# Patient Record
Sex: FEMALE | Race: WHITE | NOT HISPANIC OR LATINO | Employment: OTHER | URBAN - METROPOLITAN AREA
[De-identification: names, ages, dates, MRNs, and addresses within clinical notes are randomized per-mention and may not be internally consistent; named-entity substitution may affect disease eponyms.]

---

## 2017-10-10 ENCOUNTER — CLINICAL SUPPORT (OUTPATIENT)
Dept: INTERNAL MEDICINE | Facility: CLINIC | Age: 52
End: 2017-10-10
Payer: COMMERCIAL

## 2017-10-10 ENCOUNTER — OFFICE VISIT (OUTPATIENT)
Dept: PULMONOLOGY | Facility: CLINIC | Age: 52
End: 2017-10-10
Payer: COMMERCIAL

## 2017-10-10 VITALS
SYSTOLIC BLOOD PRESSURE: 120 MMHG | DIASTOLIC BLOOD PRESSURE: 81 MMHG | HEIGHT: 64 IN | BODY MASS INDEX: 28 KG/M2 | HEART RATE: 73 BPM | WEIGHT: 164 LBS

## 2017-10-10 DIAGNOSIS — Z00.00 ANNUAL PHYSICAL EXAM: Primary | ICD-10-CM

## 2017-10-10 DIAGNOSIS — Z00.00 ROUTINE GENERAL MEDICAL EXAMINATION AT A HEALTH CARE FACILITY: Primary | ICD-10-CM

## 2017-10-10 LAB
ALBUMIN SERPL BCP-MCNC: 3.5 G/DL
ALP SERPL-CCNC: 66 U/L
ALT SERPL W/O P-5'-P-CCNC: 15 U/L
ANION GAP SERPL CALC-SCNC: 6 MMOL/L
AST SERPL-CCNC: 15 U/L
BILIRUB SERPL-MCNC: 0.5 MG/DL
BUN SERPL-MCNC: 13 MG/DL
CALCIUM SERPL-MCNC: 9.3 MG/DL
CHLORIDE SERPL-SCNC: 106 MMOL/L
CHOLEST SERPL-MCNC: 185 MG/DL
CHOLEST/HDLC SERPL: 2.8 {RATIO}
CO2 SERPL-SCNC: 26 MMOL/L
CREAT SERPL-MCNC: 0.8 MG/DL
ERYTHROCYTE [DISTWIDTH] IN BLOOD BY AUTOMATED COUNT: 14.4 %
EST. GFR  (AFRICAN AMERICAN): >60 ML/MIN/1.73 M^2
EST. GFR  (NON AFRICAN AMERICAN): >60 ML/MIN/1.73 M^2
ESTIMATED AVG GLUCOSE: 108 MG/DL
GLUCOSE SERPL-MCNC: 98 MG/DL
HBA1C MFR BLD HPLC: 5.4 %
HCT VFR BLD AUTO: 37.5 %
HCV AB SERPL QL IA: NEGATIVE
HDLC SERPL-MCNC: 66 MG/DL
HDLC SERPL: 35.7 %
HGB BLD-MCNC: 11.7 G/DL
LDLC SERPL CALC-MCNC: 102.4 MG/DL
MCH RBC QN AUTO: 26.1 PG
MCHC RBC AUTO-ENTMCNC: 31.2 G/DL
MCV RBC AUTO: 84 FL
NONHDLC SERPL-MCNC: 119 MG/DL
PLATELET # BLD AUTO: 313 K/UL
PMV BLD AUTO: 9.8 FL
POTASSIUM SERPL-SCNC: 4.3 MMOL/L
PROT SERPL-MCNC: 7.1 G/DL
RBC # BLD AUTO: 4.48 M/UL
SODIUM SERPL-SCNC: 138 MMOL/L
TRIGL SERPL-MCNC: 83 MG/DL
TSH SERPL DL<=0.005 MIU/L-ACNC: 2.33 UIU/ML
WBC # BLD AUTO: 4.84 K/UL

## 2017-10-10 PROCEDURE — 99396 PREV VISIT EST AGE 40-64: CPT | Mod: S$GLB,,, | Performed by: INTERNAL MEDICINE

## 2017-10-10 PROCEDURE — 97750 PHYSICAL PERFORMANCE TEST: CPT | Mod: S$GLB,,, | Performed by: INTERNAL MEDICINE

## 2017-10-10 PROCEDURE — 86803 HEPATITIS C AB TEST: CPT

## 2017-10-10 PROCEDURE — 80061 LIPID PANEL: CPT

## 2017-10-10 PROCEDURE — 80053 COMPREHEN METABOLIC PANEL: CPT

## 2017-10-10 PROCEDURE — 85027 COMPLETE CBC AUTOMATED: CPT

## 2017-10-10 PROCEDURE — 97802 MEDICAL NUTRITION INDIV IN: CPT | Mod: S$GLB,,, | Performed by: INTERNAL MEDICINE

## 2017-10-10 PROCEDURE — 84443 ASSAY THYROID STIM HORMONE: CPT

## 2017-10-10 PROCEDURE — 99999 PR PBB SHADOW E&M-EST. PATIENT-LVL III: CPT | Mod: PBBFAC,,, | Performed by: INTERNAL MEDICINE

## 2017-10-10 PROCEDURE — 36415 COLL VENOUS BLD VENIPUNCTURE: CPT

## 2017-10-10 PROCEDURE — 83036 HEMOGLOBIN GLYCOSYLATED A1C: CPT

## 2017-10-10 NOTE — PROGRESS NOTES
Subjective:       Patient ID: Timo Camargo is a 52 y.o. female.    Chief Complaint: Annual Exam    HPI  51 yo [employer] spouse,  works in the [employer] division. She feels well had a partial meniscuectomy on her left knee in June. Her left sciatic pain resolved with the medrol dospak. She feels well in general.She takes, zocor, wellbutrin and altace as her only medications.  Review of Systems   Constitutional: Negative.    HENT: Negative.    Eyes: Negative.    Respiratory: Negative.    Cardiovascular: Negative.    Gastrointestinal: Negative.    Genitourinary: Negative.    Musculoskeletal: Negative.         Left knee surgery in June.    Hx of limited sciatica pain last year. Resolved   Skin: Negative.    Neurological: Negative.    Hematological:        Hx of mild intermittent iron deficiency   Psychiatric/Behavioral: Negative.    All other systems reviewed and are negative.      Objective:      Physical Exam   Constitutional: She is oriented to person, place, and time. She appears well-developed and well-nourished. No distress.   HENT:   Head: Normocephalic and atraumatic.   Right Ear: External ear normal.   Left Ear: External ear normal.   Nose: Nose normal.   Mouth/Throat: Oropharynx is clear and moist.   Eyes: Conjunctivae and EOM are normal. Pupils are equal, round, and reactive to light.   Neck: Normal range of motion. Neck supple. No JVD present. No thyromegaly present.   Cardiovascular: Normal rate, regular rhythm, normal heart sounds and intact distal pulses.  Exam reveals no gallop.    No murmur heard.  Pulmonary/Chest: Breath sounds normal. No stridor. No respiratory distress. She has no wheezes. She has no rales. She exhibits no tenderness.   Peak flow 400 l/min   Abdominal: Soft. Bowel sounds are normal. She exhibits no distension and no mass. There is no tenderness. There is no rebound and no guarding.   Musculoskeletal: Normal range of motion. She exhibits no edema.   Lymphadenopathy:     She has no  cervical adenopathy.   Neurological: She is alert and oriented to person, place, and time. She has normal reflexes. She displays normal reflexes. No cranial nerve deficit.   Skin: Skin is warm and dry. No rash noted.   Psychiatric: She has a normal mood and affect. Her behavior is normal. Judgment and thought content normal.   Nursing note and vitals reviewed.      Assessment:       No diagnosis found.    Plan:            Labs: All parameters are normal. IMP Healthy Female with good health habits.

## 2017-10-10 NOTE — PROGRESS NOTES
"Nutrition Assessment  Client name:  Timo Camargo  :  1965  Age:  52 y.o.  Gender:  female    Client states:  Very pleasant spouse of a [employer] employee here for her annual Executive Health physical.  Denies significant changes in her PMH since last year although underwent meniscus repair.  Is a current Weight Watcher member although has not attended meetings for a month or so.  Travels weekly for work in [employer] and often, grabs food on the go if she does not have meals and snacks packed.  Is knowledgeable regarding healthy food choices although admits to poor food choices recently.  Has maintained an active lifestyle over the past several years, noting that her exercise regimen is "not the problem."  Desires to achieve goal weight of 146# although weight has steadily increased from 152# to 168#.  Inquired about new food trends, benefits of a plant-based diet, daily Calcium needs, and vitamin/mineral supplementation.     Past Medical History:   Diagnosis Date    Hypertension        Social History    Marital status:    Employment:  Unable to assess  Social History   Substance Use Topics    Smoking status: Former Smoker     Packs/day: 1.00     Years: 15.00     Types: Cigarettes     Quit date: 1/3/2005    Smokeless tobacco: Never Used    Alcohol use Yes      Comment: social drinker        Current medications:  has a current medication list which includes the following prescription(s): bupropion, calcium citrate-vitamin d3 315-200 mg, multivit-iron-min-folic acid, nitrofurantoin, ramipril, and simvastatin.  Vitamins, minerals, and/or supplements:  None   Food allergies or intolerances:  NKFA     Food History  Breakfast:  Eggs  Mid-morning Snack:  Yogurt  Lunch:  Salad + protein + fruit + water  Dinner:  Grilled chicken + vegetables + water    Exercise History:  Varied cardio and weight training (pilates, hot yoga, etc.) 3x/week    Lab Reports   Total Cholesterol:  185    Triglycerides:  " "83  HDL:  66  LDL:  102.4   Glucose:  98  HbA1c:  5.4%  BP:  120/81   Hgb:  11.7  Hct:  37.5%    Weight History  Height:  5' 3.25"     Weight:  168#  BMI:  29.5  % Body Fat:  36.34%    Diagnosis  RMR (Method:  Body Crowley):  1450 kcal  Activity Factor:  1.3  TORY:  1885 - 250 = 1635 kcal    Overweight related to previous improper food choices and lack of variation within exercise as evidenced by BMI:  29.5; 36.34% body fat.    Intervention    Goals:  1.  Achieve goal weight of 146#  2.  Reduce dining out frequency, packing meals and snacks  3.  Maintain current exercise regimen of 150 minutes/week or more as tolerated, varying mode of activity every 4-6 weeks  4.  Aim for 1,000 mg Calcium daily   5.  Consider MVI + fish oil supplementation  6.  Pair food sources of Iron with Vitamin C-rich foods, avoiding simultaneous consumption of Calcium-rich foods to enhance Iron absorption    To note, labs were not ready at time of consult with exception of CBC and so, not discussed with patient.  Low Hgb noted.  Discussed Iron-rich foods and ways to enhance absorption.  Answered patient's questions regarding vitamin/mineral supplementation, encouraging consideration of OTC MVI and fish oil for heart health.  Reviewed Gen[employer] Packsner's brand of vitamins/minerals and AHA's recommendations for fish oil content.  Also, discussed daily Calcium needs, UL for Calcium, potential health consequences of excessive Calcium intake, food sources of Calcium, and Calcium content of foods.  Reviewed [facility] Eat Fit Shopping List, highlighting new food products, in addition to reviewing [facility] Fast Food Guide.  Encouraged patient to maintain an active lifestyle while resuming meal prep so as to reduce restaurant foods, promote adequate energy, and sustainable weight loss.    Handouts provided:  Meal Planning Guide  Restaurant Guide  Eat Fit Shopping List  Eat Fit Gayla  Fast Food Guide  Vitamin/Mineral Guide  High Calcium Foods List  Calcium Content of " Foods    Monitoring/Evaluation    Monitor the following:  Weight  BMI  % Body Fat  Caloric intake  CBC    Follow Up Plan:  Follow up with client in 1-2 years

## 2017-10-10 NOTE — LETTER
October 10, 2017    Timo [facility]ANCO Files  [address] [place] [address]             [facility]  [address]  [facility] [address]  Phone: [phone] Dear Mrs. Camargo:    Thank you for allowing me to serve you and perform your Executive Health exam on 10/10/2017. This letter will serve as a brief summary of the physical findings and laboratory/studies performed and recommendations at this time. Today's assessment is normal in all respects. Next year schedule your colonoscope to time with your visit.         If you have any questions or concerns, please don't hesitate to call.    Sincerely,        Ryan Roque MD

## 2017-10-12 NOTE — PROGRESS NOTES
Subjective:       Patient ID: Timo Camargo is a 52 y.o. female.    Chief Complaint: No chief complaint on file.    HPI   Medical History:  Surgical hx of  ( & )  . Diagnosis of hypertension.  Medicinal hx of ramipril, simvastatin.     (L) Knee surgery  on  for partial menisectomy.  Slight ROM limitation and intermitten swellling.  She is able to be active.     Smokes occaisionally but doesn't consider herself a smoker.  She did indicate that she is going to stop cold turkey.     Activity:  Currently active. She spins 3 mins 3 times a week and does Pilates refomer classes along with Hot Yoga.     Completed fitness testing with no complications    Fitness evaluation and charting completed by: Teddy Bourgeois  Review of Systems    Objective:      The fitness evaluation results are as follows:    D.O.S. 10/10/2017   Height (in): 63.25   Weight (lbs): 168   BMI: 29.35113522   Body Fat (%): 36.34   Waist (cm): 78   Hip (cm): 109   WHR: 0.72   RBP (mmHg): 118/80   RHR (bpm): 60    Strength R (lbs)t: 88.76506054    Strength Lt (lbs): 78.01520747   Push-up Assessment: 25   Curl-up Assessment: 31   Flexibility Testing (cm): 48.5   REE (kcals): 1450     Physical Exam    Assessment:      Age/Gender Stratified Assessment:     Resting BP: Within Normal Limits   Body Fat %: Fair   WHR Risk Factor: Low Risk    Strength R: Above Average    Strength L: Above Average   Upper Body Endurance: Excellent   Abdominal Endurance: Above Average   Lower body Flexibiltiy: Excellent     1. Routine general medical examination at a health care facility        Plan:    Below guidelines should be completed.    Recommended Fitness Guidelines:   - 150 minutes of moderate intensity aerobic exercise each week OR 75 minutes of vigorous    - 2-4 days per week of resistance training for each muscle group   - Daily stretching

## 2019-04-09 DIAGNOSIS — Z00.00 ROUTINE GENERAL MEDICAL EXAMINATION AT A HEALTH CARE FACILITY: Primary | ICD-10-CM

## 2019-04-26 ENCOUNTER — TELEPHONE (OUTPATIENT)
Dept: ORTHOPEDICS | Facility: CLINIC | Age: 54
End: 2019-04-26

## 2019-04-26 DIAGNOSIS — S61.217A LACERATION OF LEFT LITTLE FINGER, FOREIGN BODY PRESENCE UNSPECIFIED, NAIL DAMAGE STATUS UNSPECIFIED, INITIAL ENCOUNTER: Primary | ICD-10-CM

## 2019-04-29 ENCOUNTER — HOSPITAL ENCOUNTER (OUTPATIENT)
Dept: RADIOLOGY | Facility: OTHER | Age: 54
Discharge: HOME | End: 2019-04-29
Attending: ORTHOPAEDIC SURGERY
Payer: COMMERCIAL

## 2019-04-29 ENCOUNTER — OFFICE VISIT (OUTPATIENT)
Dept: ORTHOPEDICS | Facility: CLINIC | Age: 54
End: 2019-04-29
Payer: COMMERCIAL

## 2019-04-29 VITALS — BODY MASS INDEX: 28 KG/M2 | HEIGHT: 64 IN | WEIGHT: 164 LBS

## 2019-04-29 DIAGNOSIS — S61.217A LACERATION OF LEFT LITTLE FINGER, FOREIGN BODY PRESENCE UNSPECIFIED, NAIL DAMAGE STATUS UNSPECIFIED, INITIAL ENCOUNTER: ICD-10-CM

## 2019-04-29 DIAGNOSIS — S64.40XA LACERATION OF DIGITAL NERVE OF FINGER, INITIAL ENCOUNTER: Primary | ICD-10-CM

## 2019-04-29 PROCEDURE — 3008F PR BODY MASS INDEX (BMI) DOCUMENTED: ICD-10-PCS | Mod: CPTII,S$GLB,, | Performed by: ORTHOPAEDIC SURGERY

## 2019-04-29 PROCEDURE — 73140 X-RAY EXAM OF FINGER(S): CPT | Mod: TC,FY

## 2019-04-29 PROCEDURE — 99204 PR OFFICE/OUTPT VISIT, NEW, LEVL IV, 45-59 MIN: ICD-10-PCS | Mod: S$GLB,,, | Performed by: ORTHOPAEDIC SURGERY

## 2019-04-29 PROCEDURE — 3008F BODY MASS INDEX DOCD: CPT | Mod: CPTII,S$GLB,, | Performed by: ORTHOPAEDIC SURGERY

## 2019-04-29 PROCEDURE — 73140 XR FINGER 2 OR MORE VIEWS: ICD-10-PCS | Mod: 26,LT,, | Performed by: RADIOLOGY

## 2019-04-29 PROCEDURE — 99204 OFFICE O/P NEW MOD 45 MIN: CPT | Mod: S$GLB,,, | Performed by: ORTHOPAEDIC SURGERY

## 2019-04-29 PROCEDURE — 99999 PR PBB SHADOW E&M-EST. PATIENT-LVL III: ICD-10-PCS | Mod: PBBFAC,,, | Performed by: ORTHOPAEDIC SURGERY

## 2019-04-29 PROCEDURE — 99999 PR PBB SHADOW E&M-EST. PATIENT-LVL III: CPT | Mod: PBBFAC,,, | Performed by: ORTHOPAEDIC SURGERY

## 2019-04-29 PROCEDURE — 73140 X-RAY EXAM OF FINGER(S): CPT | Mod: 26,LT,, | Performed by: RADIOLOGY

## 2019-04-29 RX ORDER — MUPIROCIN 20 MG/G
OINTMENT TOPICAL
Status: CANCELLED | OUTPATIENT
Start: 2019-04-29

## 2019-04-29 RX ORDER — SODIUM CHLORIDE 9 MG/ML
INJECTION, SOLUTION INTRAVENOUS CONTINUOUS
Status: CANCELLED | OUTPATIENT
Start: 2019-04-29

## 2019-04-29 NOTE — PROGRESS NOTES
[facility]  History & Physical  Orthopedics    SUBJECTIVE:      Chief Complaint: left small finger laceration    Referring Provider: No ref. provider found     History of Present Illness:  Patient is a 53 y.o. right hand dominant female who presents today with complaints of left small finger laceration. She reports cutting her finger on a clean knife last thursday. She was seen in [facility] and it was irrigated and closed. She is unable to feel distal to the laceration.     The patient is a physical therapist.    Onset of symptoms/DOI was 4 days ago.    Symptoms are aggravated by activity.    Symptoms are alleviated by rest.    Symptoms consist of laceration, numbness.    The patient rates their pain as a 0/10.    Attempted treatment(s) and/or interventions include irrigation and sutures     The patient denies any fevers, chills, N/V, D/C and presents for evaluation.       Past Medical History:   Diagnosis Date    Hypertension      Past Surgical History:   Procedure Laterality Date     SECTION       Review of patient's allergies indicates:   Allergen Reactions    Penicillins Rash    Sulfa (sulfonamide antibiotics) Rash     Social History     Social History Narrative    Not on file     Family History   Problem Relation Age of Onset    Hypertension Mother     Coronary artery disease Father     Hypertension Father          Current Outpatient Medications:     buPROPion (WELLBUTRIN XL) 300 MG 24 hr tablet, TAKE 1 TABLET DAILY, Disp: 90 tablet, Rfl: 3    MULTIVIT-IRON-MIN-FOLIC ACID 3,500-18-0.4 UNIT-MG-MG ORAL CHEW, Take by mouth once daily., Disp: , Rfl:     nitrofurantoin (MACRODANTIN) 100 MG capsule, Take 1 capsule by mouth once daily., Disp: , Rfl: 11    ramipril (ALTACE) 5 MG capsule, TAKE 1 CAPSULE DAILY, Disp: 90 capsule, Rfl: 3    simvastatin (ZOCOR) 10 MG tablet, TAKE 1 TABLET DAILY, Disp: 90 tablet, Rfl: 3    calcium citrate-vitamin D3 315-200 mg (CITRACAL+D)  "315-200 mg-unit per tablet, Take 1 tablet by mouth 2 (two) times daily., Disp: , Rfl:       Review of Systems:  Constitutional: no fever or chills  Eyes: no visual changes  ENT: no nasal congestion or sore throat  Respiratory: no cough or shortness of breath  Cardiovascular: no chest pain  Gastrointestinal: no nausea or vomiting, tolerating diet  Musculoskeletal: numbness/tingling    OBJECTIVE:      Vital Signs (Most Recent):  Vitals:    04/29/19 1459   Weight: 74.4 kg (164 lb 0.4 oz)   Height: 5' 4" (1.626 m)     Body mass index is 28.15 kg/m².      Physical Exam:  Constitutional: The patient appears well-developed and well-nourished. No distress.   Head: Normocephalic and atraumatic.   Nose: Nose normal.   Eyes: Conjunctivae and EOM are normal.   Neck: No tracheal deviation present.   Cardiovascular: Normal rate and intact distal pulses.    Pulmonary/Chest: Effort normal. No respiratory distress.   Abdominal: There is no guarding.   Neurological: The patient is alert.   Psychiatric: The patient has a normal mood and affect.     Left Hand/Wrist Examination:    Observation/Inspection:  Swelling  none    Deformity  none  Discoloration  none     Scars   none    Atrophy  none    HAND/WRIST EXAMINATION:  Finkelstein's Test   Neg  WHAT Test    Neg  Snuff box tenderness   Neg  Nicholas's Test    Neg  Hook of Hamate Tenderness  Neg  CMC grind    Neg  Circumduction test   Neg    Neurovascular Exam:  Digits WWP, brisk CR < 3s throughout  NVI motor/LTS to M/R/U nerves, radial pulse 2+  Tinel's Test - Carpal Tunnel  Neg  Tinel's Test - Cubital Tunnel  Neg  Phalen's Test    Neg  Median Nerve Compression Test Neg    ROM hand/wrist/elbow full, painless    There is a 1.5cm oblique laceration of the volar aspect of the small finger at level of DIP  She has diminished sensation distal to the laceration radially  FDP and FDS tested in isolation to small finger and intact      RRR, lungs CTAB  Abdomen soft nontender    Diagnostic " Results:     Xray - negative  EMG - n/a    ASSESSMENT/PLAN:      53 y.o. yo female with left small finger digital nerve laceration    Plan: discussed treatment options with patient including primary repair.  She would like to proceed on Thursday 5/2/19 for nerve exploration and possible repair. Risks and benefits discussed including infection, stiffness, neuroma, permanent loss of function and sensation.         Bob Greene M.D.       Never smoker

## 2019-04-29 NOTE — H&P
I have seen the patient, reviewed the Resident's history and physical, assessment and plan. I have personally interviewed and examined the patient at bedside and agree with the findings.      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 She describes dense numbness and tingling in the radial aspect of the small finger distal to the laceration.  She reports ulnar-sided sensation is within normal limits and she presents for evaluation today. On examination her FDP and FDS tendons to the small finger are intact and strong to strength exam.  She has dense numbness distal to the radial sided laceration of the left small finger with an associated Tinel's at the laceration site.  X-rays do not demonstrate any acute fractures or dislocations.     The patient is a 53-year-old female status post sharp laceration to her left small finger with findings concerning for a radial digital nerve laceration.  The risks, benefits and alternatives to surgery were discussed with the patient in detail.  Specific risks include but are not limited to failure of any attempted repairs, need for allograft nerve tissue to provide for a tension-free repair, incomplete recovery of sensation, neuroma, bleeding, infection, vessel and/or nerve damage, pain, numbness, tingling, complex regional pain syndrome, compartment syndrome, failure to return to pre-injury and/or preoperative functional status, scar sensitivity, delayed healing, inability to return to work, pulley injury, tendon injury, bowstringing, partial and/or incomplete relief of symptoms, weakness, persistence of and/or worsening of symptoms, surgical failure, osteomyelitis, amputation, loss of function, stiffness, functional debility, dysfunction, decreased  strength, need for prolonged postoperative  rehabilitation, need for further surgery, deep venous thrombosis, pulmonary embolism, arthritis and death.  The patient states an understanding and wishes to proceed with surgery.   All questions were answered.  No guarantees were implied or stated.  Written informed consent was obtained.           Bob Greene MD  [facility] [address]      Expand All Collapse All            []Hide copied text    []Hover for details      [facility]  History & Physical  Orthopedics     SUBJECTIVE:       Chief Complaint: left small finger laceration     Referring Provider: No ref. provider found      History of Present Illness:  Patient is a 53 y.o. right hand dominant female who presents today with complaints of left small finger laceration. She reports cutting her finger on a clean knife last thursday. She was seen in [facility] and it was irrigated and closed. She is unable to feel distal to the laceration.      The patient is a physical therapist.     Onset of symptoms/DOI was 4 days ago.     Symptoms are aggravated by activity.     Symptoms are alleviated by rest.     Symptoms consist of laceration, numbness.     The patient rates their pain as a 0/10.     Attempted treatment(s) and/or interventions include irrigation and sutures     The patient denies any fevers, chills, N/V, D/C and presents for evaluation.             Past Medical History:   Diagnosis Date    Hypertension              Past Surgical History:   Procedure Laterality Date     SECTION              Review of patient's allergies indicates:   Allergen Reactions    Penicillins Rash    Sulfa (sulfonamide antibiotics) Rash      Social History          Social History Narrative    Not on file            Family History   Problem Relation Age of Onset    Hypertension Mother      Coronary artery disease Father      Hypertension Father              Current Outpatient Medications:     buPROPion (WELLBUTRIN XL) 300 MG 24  "hr tablet, TAKE 1 TABLET DAILY, Disp: 90 tablet, Rfl: 3    MULTIVIT-IRON-MIN-FOLIC ACID 3,500-18-0.4 UNIT-MG-MG ORAL CHEW, Take by mouth once daily., Disp: , Rfl:     nitrofurantoin (MACRODANTIN) 100 MG capsule, Take 1 capsule by mouth once daily., Disp: , Rfl: 11    ramipril (ALTACE) 5 MG capsule, TAKE 1 CAPSULE DAILY, Disp: 90 capsule, Rfl: 3    simvastatin (ZOCOR) 10 MG tablet, TAKE 1 TABLET DAILY, Disp: 90 tablet, Rfl: 3    calcium citrate-vitamin D3 315-200 mg (CITRACAL+D) 315-200 mg-unit per tablet, Take 1 tablet by mouth 2 (two) times daily., Disp: , Rfl:         Review of Systems:  Constitutional: no fever or chills  Eyes: no visual changes  ENT: no nasal congestion or sore throat  Respiratory: no cough or shortness of breath  Cardiovascular: no chest pain  Gastrointestinal: no nausea or vomiting, tolerating diet  Musculoskeletal: numbness/tingling     OBJECTIVE:       Vital Signs (Most Recent):  Vitals       Vitals:     04/29/19 1459   Weight: 74.4 kg (164 lb 0.4 oz)   Height: 5' 4" (1.626 m)         Body mass index is 28.15 kg/m².        Physical Exam:  Constitutional: The patient appears well-developed and well-nourished. No distress.   Head: Normocephalic and atraumatic.   Nose: Nose normal.   Eyes: Conjunctivae and EOM are normal.   Neck: No tracheal deviation present.   Cardiovascular: Normal rate and intact distal pulses.    Pulmonary/Chest: Effort normal. No respiratory distress.   Abdominal: There is no guarding.   Neurological: The patient is alert.   Psychiatric: The patient has a normal mood and affect.      Left Hand/Wrist Examination:     Observation/Inspection:  Swelling                       none                  Deformity                     none  Discoloration               none                  Scars                           none                  Atrophy                        none     HAND/WRIST EXAMINATION:  Finkelstein's Test                                Neg  WHAT Test           "                               Neg  Snuff box tenderness                          Neg  Nicholas's Test                                     Neg  Hook of Hamate Tenderness              Neg  CMC grind                                           Neg  Circumduction test                              Neg     Neurovascular Exam:  Digits WWP, brisk CR < 3s throughout  NVI motor/LTS to M/R/U nerves, radial pulse 2+  Tinel's Test - Carpal Tunnel                Neg  Tinel's Test - Cubital Tunnel               Neg  Phalen's Test                                      Neg  Median Nerve Compression Test       Neg     ROM hand/wrist/elbow full, painless     There is a 1.5cm oblique laceration of the volar aspect of the small finger at level of DIP  She has diminished sensation distal to the laceration radially  FDP and FDS tested in isolation to small finger and intact        RRR, lungs CTAB  Abdomen soft nontender     Diagnostic Results:     Xray - negative  EMG - n/a     ASSESSMENT/PLAN:       53 y.o. yo female with left small finger digital nerve laceration     Plan: discussed treatment options with patient including primary repair.  She would like to proceed on Thursday 5/2/19 for nerve exploration and possible repair. Risks and benefits discussed including infection, stiffness, neuroma, permanent loss of function and sensation.           Bob Greene M.D.

## 2019-05-01 ENCOUNTER — TELEPHONE (OUTPATIENT)
Dept: ORTHOPEDICS | Facility: CLINIC | Age: 54
End: 2019-05-01

## 2019-05-01 NOTE — TELEPHONE ENCOUNTER
----- Message from Elsie Salter sent at 5/1/2019  8:08 AM CDT -----  Contact: MARY SCHWARTZ   Name of Who is Calling: MARY SCHWARTZ       What is the request in detail: Patient states she would like to  cancel her surgery that is scheduled for tomorrow 05/02/2019      Can the clinic reply by [facility]: no      What Number to Call Back if not in [facility]: [phone]

## 2019-05-01 NOTE — TELEPHONE ENCOUNTER
Called patient in regards to phone message about cancelling her surgery on 5/2/19. Left a detailed message along with a call back number so we can discuss.

## 2019-06-19 PROBLEM — F32.9 REACTIVE DEPRESSION: Status: ACTIVE | Noted: 2019-06-19

## 2019-06-19 PROBLEM — I10 ESSENTIAL (PRIMARY) HYPERTENSION: Status: ACTIVE | Noted: 2019-06-19

## 2019-06-19 PROBLEM — E78.00 HYPERCHOLESTEREMIA: Status: ACTIVE | Noted: 2019-06-19

## 2019-08-14 ENCOUNTER — OFFICE VISIT (OUTPATIENT)
Dept: PULMONOLOGY | Facility: CLINIC | Age: 54
End: 2019-08-14
Payer: COMMERCIAL

## 2019-08-14 ENCOUNTER — CLINICAL SUPPORT (OUTPATIENT)
Dept: INTERNAL MEDICINE | Facility: CLINIC | Age: 54
End: 2019-08-14
Payer: COMMERCIAL

## 2019-08-14 ENCOUNTER — HOSPITAL ENCOUNTER (OUTPATIENT)
Dept: RADIOLOGY | Facility: HOSPITAL | Age: 54
Discharge: HOME | End: 2019-08-14
Attending: INTERNAL MEDICINE
Payer: COMMERCIAL

## 2019-08-14 ENCOUNTER — HOSPITAL ENCOUNTER (OUTPATIENT)
Dept: CARDIOLOGY | Facility: CLINIC | Age: 54
Discharge: HOME | End: 2019-08-14
Attending: INTERNAL MEDICINE
Payer: COMMERCIAL

## 2019-08-14 VITALS
WEIGHT: 162 LBS | HEIGHT: 64 IN | DIASTOLIC BLOOD PRESSURE: 80 MMHG | HEART RATE: 76 BPM | BODY MASS INDEX: 27.66 KG/M2 | SYSTOLIC BLOOD PRESSURE: 133 MMHG

## 2019-08-14 DIAGNOSIS — Z00.00 ROUTINE GENERAL MEDICAL EXAMINATION AT A HEALTH CARE FACILITY: ICD-10-CM

## 2019-08-14 DIAGNOSIS — Z00.00 ROUTINE GENERAL MEDICAL EXAMINATION AT A HEALTH CARE FACILITY: Primary | ICD-10-CM

## 2019-08-14 DIAGNOSIS — Z00.00 ANNUAL PHYSICAL EXAM: Primary | ICD-10-CM

## 2019-08-14 LAB
ALBUMIN SERPL BCP-MCNC: 3.9 G/DL (ref 3.5–5.2)
ALP SERPL-CCNC: 61 U/L (ref 55–135)
ALT SERPL W/O P-5'-P-CCNC: 17 U/L (ref 10–44)
ANION GAP SERPL CALC-SCNC: 8 MMOL/L (ref 8–16)
AST SERPL-CCNC: 16 U/L (ref 10–40)
BILIRUB SERPL-MCNC: 0.4 MG/DL (ref 0.1–1)
BUN SERPL-MCNC: 13 MG/DL (ref 6–20)
CALCIUM SERPL-MCNC: 10.2 MG/DL (ref 8.7–10.5)
CHLORIDE SERPL-SCNC: 106 MMOL/L (ref 95–110)
CHOLEST SERPL-MCNC: 181 MG/DL (ref 120–199)
CHOLEST/HDLC SERPL: 2.5 {RATIO} (ref 2–5)
CO2 SERPL-SCNC: 29 MMOL/L (ref 23–29)
CREAT SERPL-MCNC: 0.9 MG/DL (ref 0.5–1.4)
CV STRESS BASE HR: 65 BPM
DIASTOLIC BLOOD PRESSURE: 80 MMHG
ERYTHROCYTE [DISTWIDTH] IN BLOOD BY AUTOMATED COUNT: 12.5 % (ref 11.5–14.5)
EST. GFR  (AFRICAN AMERICAN): >60 ML/MIN/1.73 M^2
EST. GFR  (NON AFRICAN AMERICAN): >60 ML/MIN/1.73 M^2
ESTIMATED AVG GLUCOSE: 100 MG/DL (ref 68–131)
GLUCOSE SERPL-MCNC: 95 MG/DL (ref 70–110)
HBA1C MFR BLD HPLC: 5.1 % (ref 4–5.6)
HCT VFR BLD AUTO: 41.2 % (ref 37–48.5)
HDLC SERPL-MCNC: 71 MG/DL (ref 40–75)
HDLC SERPL: 39.2 % (ref 20–50)
HGB BLD-MCNC: 12.8 G/DL (ref 12–16)
LDLC SERPL CALC-MCNC: 87.2 MG/DL (ref 63–159)
MCH RBC QN AUTO: 29.6 PG (ref 27–31)
MCHC RBC AUTO-ENTMCNC: 31.1 G/DL (ref 32–36)
MCV RBC AUTO: 95 FL (ref 82–98)
NONHDLC SERPL-MCNC: 110 MG/DL
OHS CV CPX 1 MINUTE RECOVERY HEART RATE: 123 BPM
OHS CV CPX 85 PERCENT MAX PREDICTED HEART RATE MALE: 136
OHS CV CPX ESTIMATED METS: 15
OHS CV CPX MAX PREDICTED HEART RATE: 160
OHS CV CPX PATIENT IS FEMALE: 1
OHS CV CPX PATIENT IS MALE: 0
OHS CV CPX PEAK DIASTOLIC BLOOD PRESSURE: 91 MMHG
OHS CV CPX PEAK HEAR RATE: 141 BPM
OHS CV CPX PEAK RATE PRESSURE PRODUCT: NORMAL
OHS CV CPX PEAK SYSTOLIC BLOOD PRESSURE: 161 MMHG
OHS CV CPX PERCENT MAX PREDICTED HEART RATE ACHIEVED: 88
OHS CV CPX RATE PRESSURE PRODUCT PRESENTING: 7735
PLATELET # BLD AUTO: 281 K/UL (ref 150–350)
PMV BLD AUTO: 10.3 FL (ref 9.2–12.9)
POTASSIUM SERPL-SCNC: 4.8 MMOL/L (ref 3.5–5.1)
PROT SERPL-MCNC: 7.2 G/DL (ref 6–8.4)
RBC # BLD AUTO: 4.32 M/UL (ref 4–5.4)
SODIUM SERPL-SCNC: 143 MMOL/L (ref 136–145)
STRESS ECHO POST EXERCISE DUR MIN: 8 MINUTES
STRESS ECHO POST EXERCISE DUR SEC: 31 SECONDS
STRESS ECHO TARGET HR: 142.8 BPM
SYSTOLIC BLOOD PRESSURE: 119 MMHG
TRIGL SERPL-MCNC: 114 MG/DL (ref 30–150)
TSH SERPL DL<=0.005 MIU/L-ACNC: 2.23 UIU/ML (ref 0.4–4)
WBC # BLD AUTO: 4.2 K/UL (ref 3.9–12.7)

## 2019-08-14 PROCEDURE — 93016 CV STRESS TEST SUPVJ ONLY: CPT | Mod: ,,, | Performed by: INTERNAL MEDICINE

## 2019-08-14 PROCEDURE — 71046 XR CHEST PA AND LATERAL: ICD-10-PCS | Mod: 26,,, | Performed by: RADIOLOGY

## 2019-08-14 PROCEDURE — 80053 COMPREHEN METABOLIC PANEL: CPT

## 2019-08-14 PROCEDURE — 3075F SYST BP GE 130 - 139MM HG: CPT | Mod: CPTII,S$GLB,, | Performed by: INTERNAL MEDICINE

## 2019-08-14 PROCEDURE — 83036 HEMOGLOBIN GLYCOSYLATED A1C: CPT

## 2019-08-14 PROCEDURE — 99386 PREV VISIT NEW AGE 40-64: CPT | Mod: S$PBB,,, | Performed by: INTERNAL MEDICINE

## 2019-08-14 PROCEDURE — 97802 MEDICAL NUTRITION INDIV IN: CPT | Mod: S$GLB,,, | Performed by: INTERNAL MEDICINE

## 2019-08-14 PROCEDURE — 99386 PR PREVENTIVE VISIT,NEW,40-64: ICD-10-PCS | Mod: S$PBB,,, | Performed by: INTERNAL MEDICINE

## 2019-08-14 PROCEDURE — 97750 PHYSICAL PERFORMANCE TEST: CPT | Mod: S$GLB,,, | Performed by: INTERNAL MEDICINE

## 2019-08-14 PROCEDURE — 3079F PR MOST RECENT DIASTOLIC BLOOD PRESSURE 80-89 MM HG: ICD-10-PCS | Mod: CPTII,S$GLB,, | Performed by: INTERNAL MEDICINE

## 2019-08-14 PROCEDURE — 97802 PR MED NUTR THER, 1ST, INDIV, EA 15 MIN: ICD-10-PCS | Mod: S$GLB,,, | Performed by: INTERNAL MEDICINE

## 2019-08-14 PROCEDURE — 99999 PR PBB SHADOW E&M-EST. PATIENT-LVL II: ICD-10-PCS | Mod: PBBFAC,,, | Performed by: INTERNAL MEDICINE

## 2019-08-14 PROCEDURE — 3079F DIAST BP 80-89 MM HG: CPT | Mod: CPTII,S$GLB,, | Performed by: INTERNAL MEDICINE

## 2019-08-14 PROCEDURE — 93018 CV STRESS TEST I&R ONLY: CPT | Mod: ,,, | Performed by: INTERNAL MEDICINE

## 2019-08-14 PROCEDURE — 3075F PR MOST RECENT SYSTOLIC BLOOD PRESS GE 130-139MM HG: ICD-10-PCS | Mod: CPTII,S$GLB,, | Performed by: INTERNAL MEDICINE

## 2019-08-14 PROCEDURE — 93018 TREADMILL STRESS TEST (CUPID ONLY): ICD-10-PCS | Mod: ,,, | Performed by: INTERNAL MEDICINE

## 2019-08-14 PROCEDURE — 71046 X-RAY EXAM CHEST 2 VIEWS: CPT | Mod: TC,FY

## 2019-08-14 PROCEDURE — 93016 TREADMILL STRESS TEST (CUPID ONLY): ICD-10-PCS | Mod: ,,, | Performed by: INTERNAL MEDICINE

## 2019-08-14 PROCEDURE — 84443 ASSAY THYROID STIM HORMONE: CPT

## 2019-08-14 PROCEDURE — 80061 LIPID PANEL: CPT

## 2019-08-14 PROCEDURE — 71046 X-RAY EXAM CHEST 2 VIEWS: CPT | Mod: 26,,, | Performed by: RADIOLOGY

## 2019-08-14 PROCEDURE — 85027 COMPLETE CBC AUTOMATED: CPT

## 2019-08-14 PROCEDURE — 93017 CV STRESS TEST TRACING ONLY: CPT

## 2019-08-14 PROCEDURE — 36415 COLL VENOUS BLD VENIPUNCTURE: CPT

## 2019-08-14 PROCEDURE — 99999 PR PBB SHADOW E&M-EST. PATIENT-LVL II: CPT | Mod: PBBFAC,,, | Performed by: INTERNAL MEDICINE

## 2019-08-14 PROCEDURE — 97750 PR PHYSICAL PERFORMANCE TEST: ICD-10-PCS | Mod: S$GLB,,, | Performed by: INTERNAL MEDICINE

## 2019-08-14 NOTE — PROGRESS NOTES
Subjective:       Patient ID: Timo Camargo is a 54 y.o. female.    Chief Complaint: Annual Exam    HPI 55 yo [employer] spouse (  works in the [employer] division) comes for her periodic health exam. She feels well, exercises 5 days a week in the early AM before work. Her knee surgery was successful and she is not taking any medications at the present time.  Review of Systems   Constitutional: Negative.    HENT: Negative.    Eyes: Negative.    Respiratory: Negative.    Cardiovascular: Negative.    Gastrointestinal: Negative.    Genitourinary: Negative.    Musculoskeletal: Negative.         Successful knee surgery   Skin: Negative.    Neurological: Negative.    Psychiatric/Behavioral: Negative.    All other systems reviewed and are negative.      Objective:      Physical Exam   Constitutional: She is oriented to person, place, and time. She appears well-developed and well-nourished. No distress.   HENT:   Head: Normocephalic and atraumatic.   Right Ear: External ear normal.   Left Ear: External ear normal.   Nose: Nose normal.   Mouth/Throat: Oropharynx is clear and moist.   Eyes: Pupils are equal, round, and reactive to light. Conjunctivae and EOM are normal.   Neck: Normal range of motion. Neck supple. No JVD present. No thyromegaly present.   Cardiovascular: Normal rate, regular rhythm, normal heart sounds and intact distal pulses. Exam reveals no gallop.   No murmur heard.  BP: 118/76   Pulmonary/Chest: Breath sounds normal. No stridor. No respiratory distress. She has no wheezes. She has no rales. She exhibits no tenderness.   Abdominal: Soft. Bowel sounds are normal. She exhibits no distension and no mass. There is no tenderness. There is no rebound and no guarding.   Musculoskeletal: Normal range of motion. She exhibits no edema.   Successful knee surgery.   Lymphadenopathy:     She has no cervical adenopathy.   Neurological: She is alert and oriented to person, place, and time. She has normal reflexes. She  displays normal reflexes. No cranial nerve deficit.   Skin: Skin is warm and dry. No rash noted.   Psychiatric: She has a normal mood and affect. Her behavior is normal. Judgment and thought content normal.   Nursing note and vitals reviewed.      Assessment:       1. Annual physical exam        Plan:           Labs:All lab parameters are normal. Chest x-ray is clear and Stress EKG is negative for ischemia.  IMP: Healthy Female with good health habits.    <<-----Click here for Discharge Medication Review

## 2019-08-14 NOTE — LETTER
August 14, 2019    Timo FRANCO Files  [address] [facility] [address] [address] [place] [address]             [facility]  [address]  [facility] [address]  Phone: [phone] Dear Mrs. Camargo:    Thank you for allowing me to serve you and perform your Executive Health exam on 8/14/2019. This letter will serve as a brief summary of the physical findings and laboratory/studies performed and recommendations at this time. Today's assessment is essentially normal. Keep swinging of those straps, it agrees with you.      If you have any questions or concerns, please don't hesitate to call.    Sincerely,        Ryan Roque MD

## 2019-08-14 NOTE — PROGRESS NOTES
"Nutrition Assessment  Client name:  Timo Camargo     (Annual  physical)  :  1965  Age:  54 y.o.  Gender:  female    Client states:  Has history of HTN and Hypercholesterolemia for which she takes Ramipril and Simvastatin. Shares that her blood pressure has been under good control, however today it is elevated and this is concerning . Has in hand the Digital Hypertension program booklet and expresses an interest in enrollment and will give it serious thought. Additionally, as a spouse of a [employer] employee she inquires if she can participate in the Be Well at [employer] wt. Reduction program. This question was posed to the  Clinical Manager, and she is eligible and can sign up through the [employer] portal. She is currently a member of the Weight Watcher's program, however her involvement and compliance varies with proper food choices. Acknowledges that she requires a structured program to have the best success with wt. Loss. 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 Her assessment is that she requires more discipline with food choices and does not have a sweet tooth, as does her , but prefers salty savory snacks. Has nutrition related questions re: is it better to eat fresh egg whites, egg beaters or fresh eggs with yolk, and serving size of avocado. By next visit she would like to lose wt. And sustain it and have improved reading with her blood pressure.     Anthropometrics  Height:  5'3,5"     Weight:  162  BMI:  28.30  % Body Fat:  33.58    Clinical Signs/Symptoms  N/V/D:  none  Appetite (Good, Fair, or Poor):  good      Past Medical History:   Diagnosis Date    Depression     Hypercholesterolemia     Hypertension        Past Surgical History:   Procedure Laterality Date     SECTION      OVARIAN CYST REMOVAL      REPAIR OF MENISCUS OF " KNEE Right        Medications    has a current medication list which includes the following prescription(s): bupropion, calcium citrate-vitamin d3 315-200 mg, multivit-iron-min-folic acid, nitrofurantoin, progesterone, ramipril, and simvastatin.    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    Social History     Tobacco Use    Smoking status: Former Smoker     Packs/day: 1.00     Years: 15.00     Pack years: 15.00     Types: Cigarettes     Last attempt to quit: 1/3/2005     Years since quittin.6    Smokeless tobacco: Never Used   Substance Use Topics    Alcohol use: Yes     Comment: social drinker        Lab Reports   Total Cholesterol:  181    Triglycerides:  114  HDL:  87.2  LDL:  87.2   Glucose:  95  HbA1c:  5.1  BP:  128/84     Food History  Breakfast:  Egg or avocado toast  Mid-morning Snack:  Yogurt  Lunch:  Salad + protein + fruit + Crystal light  Dinner:  Grilled chicken + vegetables + water  Mid-afternoon Snack:  Sometimes apple with pb2  H.S. Snack:  none  *Fluid intake:  Water, ETOH    Exercise History:  TRX 40 minutes 3x/wk    Cultural/Spiritual/Personal Preferences:  None identified    Support System:  Friends, Wt. Watcher's program    State of Change:  Preparation    Barriers to Change:  Lack of structured support system, self motivation    Diagnosis    Overweight related to inadequate caloric intake and improper food choices as evidenced by BMI:  28.30 and 33.58% body fat.    Intervention    RMR (Method:  Body [place]):  1530 kcal  Activity Factor:  1.4  TORY:  2142 - 500 = 1642    Goals:  1.  Implement caloric tracking on phone of 1600 calories, 160 grams carbohydrates, 120 grams protein, 53 grams fat  2.  Consider enrollment in Be Well at [employer] [facility] - wt. Loss program and Digital HTN program  3.  Verify daily  intake of 81 oz. healthy fluids daily  4.  Utilize sample meal plan as guideline of 3 meals + 1 snack  5.  Goal wt: 150#  6.  Continue aerobic activity - increase to 4x/wk if feasible    Nutrition Education  Reviewed and explained laboratory results and complimented client on improvements of Cholesterol, LDL and HDL. Explained the importance of accurate caloric intake based on REE today to promote# wt. Loss per week. Calculated daily caloric intake and macronutrient distribution and provided sample meal plan for guidance and structure. Encouraged whole grains carb's and no white. Gave suggestions for healthy savory snacks and client is interested in a trial of the Beanitos mac and cheese crunch and flavored soy crisps. Reviewed the best brands for snack protein bars, ice cream substitutes (inquired about this for her ), bread, cereals, cheese, and yogurt. Explained the benefits of food tracking and client agrees to use tim loaded on her phone. Answered all questions.     Patient verbalized understanding of nutrition education and recommendations received.    Handouts Provided  Meal Planning Guide  Restaurant Guide  Eat Fit Shopping List  Eat Fit Gayla  Fast Food Guide  Vitamin/Mineral Guide    Monitoring/Evaluation    Monitor the following:  Weight  BMI  % Body Fat  Caloric intake  Labs:  CMP/Lipids    Follow Up Plan:  Communication with referring healthcare provider is unnecessary at this time as patient presented as part of annual wellness exam.  However, will follow up with patient in 1-2 years.

## 2019-08-14 NOTE — PROGRESS NOTES
Subjective:       Patient ID: Timo Camargo is a 54 y.o. female.    Chief Complaint: No chief complaint on file.    HPI   Pt. Has no significant cardiovascular or pulmonary history.  Patient has a history of hypertension and hyperlipidemia for which she takes an ACE inhibitor and a Statin.      Physical Limitations:  Patient had a right meniscus repair in 2017.  She completed physical therapy and denies any physical limitations.      Current exercise routine:  Patient currently attends a 40 minute TRX class that consists of 10 minutes of vigorous intensity aerobic activity, full body resistance training exercises, and stretching, 3 days a week.    Goals:  Patient has a long term goal weight of 145 lbs and set a year goal weight of 150 lbs.    Fun Facts:  Patient was very friendly and engaged.  Patient likes attending classes at the gym and tries to vary her classes every now and then.  Patient was receptive to all recommendations made.        Review of Systems    Objective:   The fitness evaluation results are as follows:  D.O.S. 8/14/2019 10/10/2017   Height (in): 63.5 63.25   Weight (lbs): 162 168   BMI: 28.235460 29.820742   Body Fat (%): 33.58 36.34   Waist (cm): 82 78   Hip (cm): 108 109   WHR: 0.76 0.72   RBP (mmHg): 128/84 118/80   RHR (bpm): 66 60    Strength R (lbs)t: 81.777901 88.551290    Strength Lt (lbs): 70 78.879060   Push-up Assessment: 35 25   Curl-up Assessment: 75 31   Flexibility Testing (cm): 46 48.5   REE (kcals): 1530 1450         Physical Exam    Assessment:     Age/gender stratified assessment:  Resting BP: Within Normal Limits   Body Fat %: Good   WHR Risk Factor: Low Risk    Strength R: Above Average    Strength L: Above Average   Upper Body Endurance: Excellent   Abdominal Endurance: Well Above Average   Lower body Flexibiltiy: Excellent       1. Routine general medical examination at a health care facility        Plan:       Recommended fitness guidelines:    -150  minutes of moderate intensity aerobic exercise per week or 75 minutes of vigorous intensity aerobic exercise per week.   Try to reach a minimum of 10,000 steps per day.  Try to walk or swim for 30 minutes, 3 days a week.  Incorporate some interval training to increase your heart rate for short periods of time.      -2 to 4 days per week of resistance training for each muscle group.      -Daily stretching with a hold of at least 30 seconds per muscle group.

## 2019-08-26 PROBLEM — Z12.11 COLON CANCER SCREENING: Status: ACTIVE | Noted: 2019-08-26

## 2019-08-28 ENCOUNTER — PATIENT MESSAGE (OUTPATIENT)
Dept: PULMONOLOGY | Facility: CLINIC | Age: 54
End: 2019-08-28

## 2019-11-14 PROBLEM — E78.00 HYPERCHOLESTEREMIA: Chronic | Status: ACTIVE | Noted: 2019-06-19

## 2019-11-14 PROBLEM — I10 ESSENTIAL (PRIMARY) HYPERTENSION: Chronic | Status: ACTIVE | Noted: 2019-06-19

## 2019-11-14 PROBLEM — F32.9 REACTIVE DEPRESSION: Chronic | Status: ACTIVE | Noted: 2019-06-19

## 2020-08-05 ENCOUNTER — OFFICE VISIT (OUTPATIENT)
Dept: ORTHOPEDICS | Facility: CLINIC | Age: 55
End: 2020-08-05
Payer: COMMERCIAL

## 2020-08-05 DIAGNOSIS — M25.774 OSTEOPHYTE OF RIGHT FOOT: Primary | ICD-10-CM

## 2020-08-05 PROCEDURE — 99213 OFFICE O/P EST LOW 20 MIN: CPT | Mod: S$GLB,,, | Performed by: ORTHOPAEDIC SURGERY

## 2020-08-05 PROCEDURE — 99213 PR OFFICE/OUTPT VISIT, EST, LEVL III, 20-29 MIN: ICD-10-PCS | Mod: S$GLB,,, | Performed by: ORTHOPAEDIC SURGERY

## 2020-08-05 PROCEDURE — 99999 PR PBB SHADOW E&M-EST. PATIENT-LVL II: ICD-10-PCS | Mod: PBBFAC,,, | Performed by: ORTHOPAEDIC SURGERY

## 2020-08-05 PROCEDURE — 99999 PR PBB SHADOW E&M-EST. PATIENT-LVL II: CPT | Mod: PBBFAC,,, | Performed by: ORTHOPAEDIC SURGERY

## 2020-08-07 NOTE — PROGRESS NOTES
Subjective:      Patient ID: Timo Camargo is a 55 y.o. female.    Chief Complaint:  Subungual exostosis right big toe     HPI: This is a 55-year-old female who has been having some intermittent discomfort of her right big toe and had an x-ray which revealed a small bony protrusion of the distal phalanx of the big toe and had a CT scan which suggested a 3 mm subungual exostosis.  She was referred for evaluation.  She states that she will occasionally get burning type pain on the dorsum of her big toe especially when she plantar flexes the toe and she also noted a mildly tender protrusion on the medial aspect of her big toe.  She does not report any pain underneath her big toenail.  She states that her symptoms are not disabling or causing any dysfunction but she is mainly concerned as to what this could be.    Past Medical History:   Diagnosis Date    Depression     Hypercholesterolemia     Hypertension        Social History     Occupational History    Not on file   Tobacco Use    Smoking status: Former Smoker     Packs/day: 1.00     Years: 15.00     Pack years: 15.00     Types: Cigarettes     Quit date: 1/3/2005     Years since quitting: 15.6    Smokeless tobacco: Never Used   Substance and Sexual Activity    Alcohol use: Yes     Comment: social drinker    Drug use: No    Sexual activity: Yes     Partners: Male      ROS:  Negative for chest pain, shortness of breath, fevers, or unexplained weight loss.      Objective:       This is a well-developed well-nourished 5 ft 4 in 168 lb female who walks in with a normal gait.  On standing inspection she has plantigrade alignment of both feet with well-maintained arches.  There is no asymmetry between her 2 feet.  On sitting exam she has full motion of her right ankle and subtalar joints.  She has full motion of her toes.  With extreme plantar flexion of her big toe she gets some discomfort dorsally.  She does not have any pain with dorsiflexion of the big toe.   There is a small palpable prominence on the medial aspect of the toe over the distal phalanx.  This is mildly tender.  There is no swelling or erythema noted.  She is neurovascularly intact.    Imaging:  I reviewed her x-rays and the CT scan of the right foot.  Plain x-rays reveal a small osteophyte coming off the medial aspect of the distal phalanx.  All of her joints well-maintained including the big toe MTP joint.  The CT scan confirms the same osteophyte.  The osteophyte is not underneath the toenail.  There are no changes to suggest any underlying infectious or pathologic process.      Assessment:       1. Osteophyte of right foot, distal phalanx big toe          Plan:       Recommendation:  I reassured her that this bony lesion is benign and I do not believe it is causing the symptoms that she is having when she hyperflexes her big toe.  No further intervention is necessary.

## 2020-08-27 DIAGNOSIS — Z00.00 ROUTINE GENERAL MEDICAL EXAMINATION AT A HEALTH CARE FACILITY: Primary | ICD-10-CM

## 2020-10-30 ENCOUNTER — HOSPITAL ENCOUNTER (OUTPATIENT)
Dept: CARDIOLOGY | Facility: CLINIC | Age: 55
Discharge: HOME | End: 2020-10-30
Payer: COMMERCIAL

## 2020-10-30 ENCOUNTER — OFFICE VISIT (OUTPATIENT)
Dept: INTERNAL MEDICINE | Facility: CLINIC | Age: 55
End: 2020-10-30
Payer: COMMERCIAL

## 2020-10-30 ENCOUNTER — CLINICAL SUPPORT (OUTPATIENT)
Dept: INTERNAL MEDICINE | Facility: CLINIC | Age: 55
End: 2020-10-30
Payer: COMMERCIAL

## 2020-10-30 VITALS
TEMPERATURE: 98 F | BODY MASS INDEX: 26.46 KG/M2 | HEIGHT: 64 IN | WEIGHT: 155 LBS | SYSTOLIC BLOOD PRESSURE: 109 MMHG | HEART RATE: 67 BPM | DIASTOLIC BLOOD PRESSURE: 74 MMHG

## 2020-10-30 DIAGNOSIS — I10 ESSENTIAL (PRIMARY) HYPERTENSION: ICD-10-CM

## 2020-10-30 DIAGNOSIS — Z23 NEED FOR SHINGLES VACCINE: ICD-10-CM

## 2020-10-30 DIAGNOSIS — Z00.00 ROUTINE GENERAL MEDICAL EXAMINATION AT A HEALTH CARE FACILITY: ICD-10-CM

## 2020-10-30 DIAGNOSIS — N39.0 FREQUENT UTI: ICD-10-CM

## 2020-10-30 DIAGNOSIS — Z00.00 ROUTINE GENERAL MEDICAL EXAMINATION AT A HEALTH CARE FACILITY: Primary | ICD-10-CM

## 2020-10-30 DIAGNOSIS — F32.9 REACTIVE DEPRESSION: ICD-10-CM

## 2020-10-30 DIAGNOSIS — Z00.00 ANNUAL PHYSICAL EXAM: Primary | ICD-10-CM

## 2020-10-30 DIAGNOSIS — L93.0 LUPUS ERYTHEMATOSUS TUMIDUS: ICD-10-CM

## 2020-10-30 LAB
ALBUMIN SERPL BCP-MCNC: 4 G/DL (ref 3.5–5.2)
ALP SERPL-CCNC: 63 U/L (ref 55–135)
ALT SERPL W/O P-5'-P-CCNC: 24 U/L (ref 10–44)
ANION GAP SERPL CALC-SCNC: 11 MMOL/L (ref 8–16)
AST SERPL-CCNC: 19 U/L (ref 10–40)
BILIRUB SERPL-MCNC: 0.5 MG/DL (ref 0.1–1)
BUN SERPL-MCNC: 12 MG/DL (ref 6–20)
CALCIUM SERPL-MCNC: 9.1 MG/DL (ref 8.7–10.5)
CHLORIDE SERPL-SCNC: 102 MMOL/L (ref 95–110)
CHOLEST SERPL-MCNC: 141 MG/DL (ref 120–199)
CHOLEST/HDLC SERPL: 2.6 {RATIO} (ref 2–5)
CO2 SERPL-SCNC: 26 MMOL/L (ref 23–29)
CREAT SERPL-MCNC: 0.8 MG/DL (ref 0.5–1.4)
ERYTHROCYTE [DISTWIDTH] IN BLOOD BY AUTOMATED COUNT: 11.6 % (ref 11.5–14.5)
EST. GFR  (AFRICAN AMERICAN): >60 ML/MIN/1.73 M^2
EST. GFR  (NON AFRICAN AMERICAN): >60 ML/MIN/1.73 M^2
ESTIMATED AVG GLUCOSE: 105 MG/DL (ref 68–131)
GLUCOSE SERPL-MCNC: 91 MG/DL (ref 70–110)
HBA1C MFR BLD HPLC: 5.3 % (ref 4–5.6)
HCT VFR BLD AUTO: 42.7 % (ref 37–48.5)
HDLC SERPL-MCNC: 55 MG/DL (ref 40–75)
HDLC SERPL: 39 % (ref 20–50)
HGB BLD-MCNC: 13.7 G/DL (ref 12–16)
LDLC SERPL CALC-MCNC: 71.4 MG/DL (ref 63–159)
MCH RBC QN AUTO: 30 PG (ref 27–31)
MCHC RBC AUTO-ENTMCNC: 32.1 G/DL (ref 32–36)
MCV RBC AUTO: 93 FL (ref 82–98)
NONHDLC SERPL-MCNC: 86 MG/DL
PLATELET # BLD AUTO: 241 K/UL (ref 150–350)
PMV BLD AUTO: 10.6 FL (ref 9.2–12.9)
POTASSIUM SERPL-SCNC: 3.9 MMOL/L (ref 3.5–5.1)
PROT SERPL-MCNC: 7 G/DL (ref 6–8.4)
RBC # BLD AUTO: 4.57 M/UL (ref 4–5.4)
SODIUM SERPL-SCNC: 139 MMOL/L (ref 136–145)
TRIGL SERPL-MCNC: 73 MG/DL (ref 30–150)
TSH SERPL DL<=0.005 MIU/L-ACNC: 1.93 UIU/ML (ref 0.4–4)
WBC # BLD AUTO: 4.36 K/UL (ref 3.9–12.7)

## 2020-10-30 PROCEDURE — 99999 PR PBB SHADOW E&M-EST. PATIENT-LVL I: ICD-10-PCS | Mod: PBBFAC,,,

## 2020-10-30 PROCEDURE — 80061 LIPID PANEL: CPT

## 2020-10-30 PROCEDURE — 84443 ASSAY THYROID STIM HORMONE: CPT

## 2020-10-30 PROCEDURE — 97750 PHYSICAL PERFORMANCE TEST: CPT | Mod: S$GLB,,, | Performed by: INTERNAL MEDICINE

## 2020-10-30 PROCEDURE — 36415 COLL VENOUS BLD VENIPUNCTURE: CPT

## 2020-10-30 PROCEDURE — 3078F DIAST BP <80 MM HG: CPT | Mod: CPTII,S$GLB,, | Performed by: INTERNAL MEDICINE

## 2020-10-30 PROCEDURE — 99999 PR PBB SHADOW E&M-EST. PATIENT-LVL I: CPT | Mod: PBBFAC,,,

## 2020-10-30 PROCEDURE — 3074F SYST BP LT 130 MM HG: CPT | Mod: CPTII,S$GLB,, | Performed by: INTERNAL MEDICINE

## 2020-10-30 PROCEDURE — 80053 COMPREHEN METABOLIC PANEL: CPT

## 2020-10-30 PROCEDURE — 99999 PR PBB SHADOW E&M-EST. PATIENT-LVL III: CPT | Mod: PBBFAC,,, | Performed by: INTERNAL MEDICINE

## 2020-10-30 PROCEDURE — 90686 IIV4 VACC NO PRSV 0.5 ML IM: CPT | Mod: S$GLB,,, | Performed by: INTERNAL MEDICINE

## 2020-10-30 PROCEDURE — 90471 FLU VACCINE (QUAD) GREATER THAN OR EQUAL TO 3YO PRESERVATIVE FREE IM: ICD-10-PCS | Mod: S$GLB,,, | Performed by: INTERNAL MEDICINE

## 2020-10-30 PROCEDURE — 3008F PR BODY MASS INDEX (BMI) DOCUMENTED: ICD-10-PCS | Mod: CPTII,S$GLB,, | Performed by: INTERNAL MEDICINE

## 2020-10-30 PROCEDURE — 99386 PR PREVENTIVE VISIT,NEW,40-64: ICD-10-PCS | Mod: 25,S$GLB,, | Performed by: INTERNAL MEDICINE

## 2020-10-30 PROCEDURE — 93010 ELECTROCARDIOGRAM REPORT: CPT | Mod: S$GLB,,, | Performed by: INTERNAL MEDICINE

## 2020-10-30 PROCEDURE — 97750 PR PHYSICAL PERFORMANCE TEST: ICD-10-PCS | Mod: S$GLB,,, | Performed by: INTERNAL MEDICINE

## 2020-10-30 PROCEDURE — 85027 COMPLETE CBC AUTOMATED: CPT

## 2020-10-30 PROCEDURE — 99999 PR PBB SHADOW E&M-EST. PATIENT-LVL III: ICD-10-PCS | Mod: PBBFAC,,, | Performed by: INTERNAL MEDICINE

## 2020-10-30 PROCEDURE — 3078F PR MOST RECENT DIASTOLIC BLOOD PRESSURE < 80 MM HG: ICD-10-PCS | Mod: CPTII,S$GLB,, | Performed by: INTERNAL MEDICINE

## 2020-10-30 PROCEDURE — 83036 HEMOGLOBIN GLYCOSYLATED A1C: CPT

## 2020-10-30 PROCEDURE — 90686 FLU VACCINE (QUAD) GREATER THAN OR EQUAL TO 3YO PRESERVATIVE FREE IM: ICD-10-PCS | Mod: S$GLB,,, | Performed by: INTERNAL MEDICINE

## 2020-10-30 PROCEDURE — 93010 EKG 12-LEAD: ICD-10-PCS | Mod: S$GLB,,, | Performed by: INTERNAL MEDICINE

## 2020-10-30 PROCEDURE — 99386 PREV VISIT NEW AGE 40-64: CPT | Mod: 25,S$GLB,, | Performed by: INTERNAL MEDICINE

## 2020-10-30 PROCEDURE — 3074F PR MOST RECENT SYSTOLIC BLOOD PRESSURE < 130 MM HG: ICD-10-PCS | Mod: CPTII,S$GLB,, | Performed by: INTERNAL MEDICINE

## 2020-10-30 PROCEDURE — 97802 MEDICAL NUTRITION INDIV IN: CPT | Mod: S$GLB,,, | Performed by: INTERNAL MEDICINE

## 2020-10-30 PROCEDURE — 90471 IMMUNIZATION ADMIN: CPT | Mod: S$GLB,,, | Performed by: INTERNAL MEDICINE

## 2020-10-30 PROCEDURE — 97802 PR MED NUTR THER, 1ST, INDIV, EA 15 MIN: ICD-10-PCS | Mod: S$GLB,,, | Performed by: INTERNAL MEDICINE

## 2020-10-30 PROCEDURE — 3008F BODY MASS INDEX DOCD: CPT | Mod: CPTII,S$GLB,, | Performed by: INTERNAL MEDICINE

## 2020-10-30 PROCEDURE — 93005 EKG 12-LEAD: ICD-10-PCS | Mod: S$GLB,,, | Performed by: INTERNAL MEDICINE

## 2020-10-30 PROCEDURE — 93005 ELECTROCARDIOGRAM TRACING: CPT | Mod: S$GLB,,, | Performed by: INTERNAL MEDICINE

## 2020-10-30 RX ORDER — ZOSTER VACCINE RECOMBINANT, ADJUVANTED 50 MCG/0.5
0.5 KIT INTRAMUSCULAR ONCE
Qty: 1 EACH | Refills: 1 | Status: SHIPPED | OUTPATIENT
Start: 2020-10-30 | End: 2020-10-30

## 2020-10-30 RX ORDER — PROGESTERONE 200 MG/1
200 CAPSULE ORAL DAILY
Qty: 90 CAPSULE | Refills: 3 | Status: SHIPPED | OUTPATIENT
Start: 2020-10-30 | End: 2021-10-22

## 2020-10-30 RX ORDER — NITROFURANTOIN (MACROCRYSTALS) 100 MG/1
100 CAPSULE ORAL DAILY
Qty: 90 CAPSULE | Refills: 1 | Status: SHIPPED | OUTPATIENT
Start: 2020-10-30

## 2020-10-30 RX ORDER — VALACYCLOVIR HYDROCHLORIDE 1 G/1
1000 TABLET, FILM COATED ORAL EVERY 12 HOURS
Qty: 14 TABLET | Refills: 3 | Status: SHIPPED | OUTPATIENT
Start: 2020-10-30 | End: 2023-03-15 | Stop reason: SDUPTHER

## 2020-10-30 RX ORDER — BUPROPION HYDROCHLORIDE 150 MG/1
150 TABLET ORAL DAILY
Qty: 90 TABLET | Refills: 3 | Status: SHIPPED | OUTPATIENT
Start: 2020-10-30 | End: 2021-10-06

## 2020-10-30 RX ORDER — ESTRADIOL 0.05 MG/D
1 FILM, EXTENDED RELEASE TRANSDERMAL
Qty: 24 PATCH | Refills: 3 | Status: SHIPPED | OUTPATIENT
Start: 2020-11-02

## 2020-10-30 NOTE — PROGRESS NOTES
"Subjective:       Patient ID: Timo Camargo is a 55 y.o. female.    Chief Complaint: No chief complaint on file.    HPI   Pt. Has no significant cardiovascular or pulmonary history.    Physical Limitations:  None.      Current exercise routine:  Patient currently attends a 40 minute TRX class, which consists of 10 minutes of vigorous intensity aerobic exercise, full-body resistance training using the TRX, and stretching, 3-4 days a week.  Patient is participating in the "couch to 5K program with her  and they job for 30 minutes, 2 days a week.    Goals:  Patient has a goal weight of 146 lbs.    Fun Facts:  Patient was very friendly and engaged.  Patient noted that she fell out of her routine during quarantine and only recently started regularly exercising again.  Patient seems very motivated to lose more weight and keep progressing her exercise routine.  Patient was receptive to all recommendations made.      Review of Systems      Objective:     The fitness evaluation results are as follows:  D.O.S. 10/30/2020 8/14/2019 10/10/2017   Height (in): 63.5 63.5 63.25   Weight (lbs): 157.4 162 168   BMI: 27.588215 28.315572 29.479109   Body Fat (%): 37.50 33.58 36.34   Waist (cm): 82 82 78   Hip (cm): 107 108 109   WHR: 0.77 0.76 0.72   RBP (mmHg): 126/90 128/84 118/80   RHR (bpm): 64 66 60    Strength R (lbs)t: 85 81.628726 88.340071    Strength Lt (lbs): 80 70 78.736412   Push-up Assessment: 40 35 25   Curl-up Assessment: 75 75 31   Flexibility Testing (cm): 45 46 48.5   REE (kcals): 1334 1530 1450       Physical Exam    Assessment:     Age/gender stratified assessment:  Resting BP: Elevated   Body Fat %: Poor   WHR Risk Factor: Low Risk    Strength R: Above Average    Strength L: Above Average   Upper Body Endurance: Excellent   Abdominal Endurance: Well Above Average   Lower body Flexibiltiy: Excellent       1. Routine general medical examination at a health care facility        Plan:     "   Recommended fitness guidelines:    -150 minutes of moderate intensity aerobic exercise per week or 75 minutes of vigorous intensity aerobic exercise per week.   Try to reach a minimum of 10,000 steps per day.      -2 to 4 days per week of resistance training for each muscle group.   Make sure that you are keeping your body challenged and progressing your exercise routine by adding sets, repetitions, weight, or switching up an exercise itself, about every 6-8 weeks, or once an exercise starts to feel too easy for you.      -Daily stretching with a hold of at least 30 seconds per muscle group.

## 2020-10-30 NOTE — LETTER
November 2, 2020    Timo FRANCO Files  [address]  [place] [address]             [facility]  [address] [place] [address], [address] [address]  Phone: [phone]  Fax: [phone] Dear  Files:    10/30/2020    Timo FRANCO Files  [address]  [place] [address]       [facility]  [address] [place] [address], [address] [address]  Phone: [phone]  Fax: [phone] Dear  Files:    Thank you for allowing me to serve you and perform your Executive Health exam on 10/30/2020.  This letter will serve a brief summary of the history, findings and discussions at that time. I have included the lab and study results for you to have available at future healthcare appointments.    Reason for Visit: Executive Health Preventive Physical Examination    Past Medical History:  Past Medical History:   Diagnosis Date    Depression     Hypercholesterolemia     Hypertension         Labs:  Recent Results (from the past 168 hour(s))   Comprehensive metabolic panel    Collection Time: 10/30/20  7:58 AM   Result Value Ref Range    Sodium 139 136 - 145 mmol/L    Potassium 3.9 3.5 - 5.1 mmol/L    Chloride 102 95 - 110 mmol/L    CO2 26 23 - 29 mmol/L    Glucose 91 70 - 110 mg/dL    BUN 12 6 - 20 mg/dL    Creatinine 0.8 0.5 - 1.4 mg/dL    Calcium 9.1 8.7 - 10.5 mg/dL    Total Protein 7.0 6.0 - 8.4 g/dL    Albumin 4.0 3.5 - 5.2 g/dL    Total Bilirubin 0.5 0.1 - 1.0 mg/dL    Alkaline Phosphatase 63 55 - 135 U/L    AST 19 10 - 40 U/L    ALT 24 10 - 44 U/L    Anion Gap 11 8 - 16 mmol/L    eGFR if African American >60.0 >60 mL/min/1.73 m^2    eGFR if non African American >60.0 >60 mL/min/1.73 m^2   CBC Without Differential    Collection Time: 10/30/20  7:58 AM   Result Value Ref Range    WBC 4.36 3.90 - 12.70 K/uL    RBC 4.57 4.00 - 5.40 M/uL    Hemoglobin 13.7 12.0 - 16.0 g/dL    Hematocrit 42.7 37.0 - 48.5 %    MCV 93 82 - 98  fL    MCH 30.0 27.0 - 31.0 pg    MCHC 32.1 32.0 - 36.0 g/dL    RDW 11.6 11.5 - 14.5 %    Platelets 241 150 - 350 K/uL    MPV 10.6 9.2 - 12.9 fL   Lipid panel    Collection Time: 10/30/20  7:58 AM   Result Value Ref Range    Cholesterol 141 120 - 199 mg/dL    Triglycerides 73 30 - 150 mg/dL    HDL 55 40 - 75 mg/dL    LDL Cholesterol 71.4 63.0 - 159.0 mg/dL    HDL/Cholesterol Ratio 39.0 20.0 - 50.0 %    Total Cholesterol/HDL Ratio 2.6 2.0 - 5.0    Non-HDL Cholesterol 86 mg/dL   TSH    Collection Time: 10/30/20  7:58 AM   Result Value Ref Range    TSH 1.925 0.400 - 4.000 uIU/mL   Hemoglobin A1c    Collection Time: 10/30/20  7:58 AM   Result Value Ref Range    Hemoglobin A1C 5.3 4.0 - 5.6 %    Estimated Avg Glucose 105 68 - 131 mg/dL     EKG:  vent. Rate : 062 BPM     Atrial Rate : 062 BPM      P-R Int : 132 ms          QRS Dur : 078 ms       QT Int : 402 ms       P-R-T Axes : 069 032 043 degrees      QTc Int : 408 ms   Normal sinus rhythm   Low voltage QRS   Otherwise normal ECG     Assessment/Recommendations:    Annual physical exam     Health Maintenance   Topic Date Due    Mammogram  02/06/2022    Lipid Panel  10/30/2025    TETANUS VACCINE  04/25/2029    Hepatitis C Screening  Completed     Health Maintenance Reviewed    Essential (primary) hypertension- since age 30's   Will stop amlodipine and monitor blood pressure- if necessary altace can be increased as well.  -     Hypertension Digital Medicine (HDMP) Enrollment Order  -     Hypertension Digital Medicine (HDMP): Assign Onboarding Questionnaires    Reactive depression/perimenopause - continue bupropion  -     buPROPion (WELLBUTRIN XL) 150 MG TB24 tablet; Take 1 tablet (150 mg total) by mouth once daily.  Dispense: 90 tablet; Refill: 3    -estradiol 0.05 mg/24 hr td ptsw (VIVELLE-DOT) 0.05 mg/24 hr; Place 1 patch onto the skin twice a week.  Dispense: 24 patch; Refill: 3  -           progesterone (PROMETRIUM) 200 MG capsule; Take 1 capsule (200 mg total) by  mouth once daily.  Dispense: 90 capsule; Refill: 3    Frequent UTI- continue nitrofurantoin as recommended by urology  -     nitrofurantoin (MACRODANTIN) 100 MG capsule; Take 1 capsule (100 mg total) by mouth once daily.  Dispense: 90 capsule; Refill: 1    Need for shingles vaccine  -     varicella-zoster [id number], PF, (SHINGRIX, PF,) 50 mcg/0.5 mL injection; Inject 0.5 mLs into the muscle once. for 1 dose  Dispense: 1 each; Refill: 1    Lupus erythematosus tumidus   Noted on neck- continue cream as recommended by dermatology     Hx of HSV  -     valACYclovir (VALTREX) 1000 MG tablet; Take 1 tablet (1,000 mg total) by mouth every 12 (twelve) hours.  Dispense: 14 tablet; Refill: 3    Need for influenza vaccine:  -     Influenza - Quadrivalent (PF)        It was a pleasure meeting you for your health exam.    If you have any questions or concerns, please don't hesitate to call.    Sincerely,    Pilar Nava MD

## 2020-11-02 NOTE — PROGRESS NOTES
Subjective:       Patient ID: Timo Camargo is a 55 y.o. female.    Chief Complaint: Annual Exam    55-year-old nonsmoking female who is here for an executive health physical.  She has a past medical history significant for essential hypertension and frequent urinary tract infections for which he is now on hormone replacement therapy and prophylactic Macrodantin.  She also has a history of Agatston score 62 back in 2015 with a CT coronary scan.  She has a positive family history in her father who had a bypass surgery in his late 50s.  She has been on simvastatin 10 mg since 2015.  Her cholesterol profile is excellent and her total cholesterol to good cholesterol ratio is 2.6 with an LDL of 71.  The remainder of her lab work to include a CMP, CBC, TSH and hemoglobin A1c were all within normal limits.  The patient has been losing weight electively with diet and exercise.  She has lost 12 lb since last year.  She would like to get off of her amlodipine 2.5 mg that was added a few months ago during the height of her perimenopause.  Since then her Wellbutrin has been decreased to 150 mg extended release and she is now having good blood pressure readings.  Her diastolic blood pressure was elevated when the amlodipine was added.  EKG showed a sinus rhythm of 62 beats per minute with consistently mild low voltage.        Review of Systems   Constitutional: Negative for activity change, appetite change, chills, diaphoresis, fatigue, fever and unexpected weight change.   HENT: Negative for nasal congestion, ear pain, mouth sores, postnasal drip, sinus pressure/congestion, sore throat and trouble swallowing.    Eyes: Negative for pain, redness and visual disturbance.   Respiratory: Negative for apnea, cough, chest tightness, shortness of breath and wheezing.    Cardiovascular: Negative for chest pain, palpitations and leg swelling.   Gastrointestinal: Negative for abdominal distention, abdominal pain, blood in stool,  constipation, diarrhea, nausea and vomiting.   Endocrine: Negative for cold intolerance, polydipsia, polyphagia and polyuria.   Genitourinary: Negative for difficulty urinating, dysuria, flank pain, frequency, hematuria, menstrual problem, pelvic pain and urgency.   Musculoskeletal: Negative for arthralgias, back pain, joint swelling and neck pain.   Integumentary:  Negative for color change, rash and wound.   Neurological: Negative for dizziness, tremors, seizures, syncope, weakness, light-headedness, numbness and headaches.   Hematological: Negative for adenopathy. Does not bruise/bleed easily.   Psychiatric/Behavioral: Negative for confusion, decreased concentration, dysphoric mood, hallucinations, self-injury, sleep disturbance and suicidal ideas. The patient is not nervous/anxious.          Objective:       Wt Readings from Last 3 Encounters:   10/30/20 70.3 kg (155 lb)   05/25/20 76.2 kg (168 lb)   11/14/19 75.8 kg (167 lb)     Temp Readings from Last 3 Encounters:   10/30/20 98.1 °F (36.7 °C) (Oral)   05/25/20 98.2 °F (36.8 °C)   11/14/19 98.1 °F (36.7 °C) (Oral)     BP Readings from Last 3 Encounters:   10/30/20 109/74   05/25/20 134/86   11/14/19 130/81     Pulse Readings from Last 3 Encounters:   10/30/20 67   05/25/20 80   11/14/19 72     Physical Exam  Vitals signs and nursing note reviewed.   Constitutional:       Appearance: She is well-developed.   HENT:      Head: Normocephalic and atraumatic.   Eyes:      General: No scleral icterus.        Right eye: No discharge.         Left eye: No discharge.   Neck:      Musculoskeletal: Neck supple.      Vascular: No JVD.   Cardiovascular:      Rate and Rhythm: Normal rate and regular rhythm.      Heart sounds: No murmur. No friction rub. No gallop.    Pulmonary:      Effort: Pulmonary effort is normal.      Breath sounds: Normal breath sounds. No wheezing or rales.   Abdominal:      General: Bowel sounds are normal. There is no distension.      Palpations:  Abdomen is soft.      Tenderness: There is no abdominal tenderness.   Musculoskeletal:         General: No tenderness.   Lymphadenopathy:      Cervical: No cervical adenopathy.   Skin:     Findings: No erythema or rash.   Neurological:      Mental Status: She is alert and oriented to person, place, and time.      Cranial Nerves: No cranial nerve deficit.      Deep Tendon Reflexes: Reflexes are normal and symmetric.         Assessment:       1. Annual physical exam    2. Essential (primary) hypertension- since age 30's    3. Reactive depression    4. Frequent UTI    5. Need for shingles vaccine    6. Lupus erythematosus tumidus        Plan:       Annual physical exam     Health Maintenance   Topic Date Due    Mammogram  02/06/2022    Lipid Panel  10/30/2025    TETANUS VACCINE  04/25/2029    Hepatitis C Screening  Completed     Health Maintenance Reviewed    Essential (primary) hypertension- since age 30's   Will stop amlodipine and monitor blood pressure- if necessary altace can be increased as well.  -     Hypertension Digital Medicine (HDMP) Enrollment Order  -     [facility] Assign On[facility] Questionnaires    Reactive depression/perimenopause - continue bupropion  -     buPROPion (WELLBUTRIN XL) 150 MG TB24 tablet; Take 1 tablet (150 mg total) by mouth once daily.  Dispense: 90 tablet; Refill: 3    -estradiol 0.05 mg/24 hr td ptsw (VIVELLE-DOT) 0.05 mg/24 hr; Place 1 patch onto the skin twice a week.  Dispense: 24 patch; Refill: 3  -           progesterone (PROMETRIUM) 200 MG capsule; Take 1 capsule (200 mg total) by mouth once daily.  Dispense: 90 capsule; Refill: 3    Frequent UTI- continue nitrofurantoin as recommended by urology  -     nitrofurantoin (MACRODANTIN) 100 MG capsule; Take 1 capsule (100 mg total) by mouth once daily.  Dispense: 90 capsule; Refill: 1    Need for shingles vaccine  -     varicella-zoster gE-AS01B, PF, (SHINGRIX, PF,) 50 mcg/0.5 mL injection; Inject 0.5  mLs into the muscle once. for 1 dose  Dispense: 1 each; Refill: 1    Lupus erythematosus tumidus   Noted on neck- continue cream as recommended by dermatology     Hx of HSV  -     valACYclovir (VALTREX) 1000 MG tablet; Take 1 tablet (1,000 mg total) by mouth every 12 (twelve) hours.  Dispense: 14 tablet; Refill: 3    Need for influenza vaccine:  -     Influenza - Quadrivalent (PF)

## 2020-11-04 ENCOUNTER — PATIENT MESSAGE (OUTPATIENT)
Dept: ADMINISTRATIVE | Facility: OTHER | Age: 55
End: 2020-11-04

## 2020-11-12 ENCOUNTER — PATIENT OUTREACH (OUTPATIENT)
Dept: OTHER | Facility: OTHER | Age: 55
End: 2020-11-12

## 2020-11-12 NOTE — PROGRESS NOTES
Digital Medicine: Health  Introduction    Introduced Timo Camargo to Digital Medicine. Discussed health  role and recommended lifestyle modifications.    The history is provided by the patient.               HYPERTENSION  Explained hypertension digital medicine goals including BP goal less than or equal to 130/80mmHg, improved convenience of BP management and reduced risk of heart attack, kidney failure, stroke, eye disease, dementia, and death.      Explained non-pharmacologic therapies like low salt diet and physical activity can reduce blood pressure. .      Explained that we expect patient to submit several blood pressure readings per week at random times of the day, but at least 30 minutes after taking blood pressure medications. Instructed patient not to allow anyone else to use their blood pressure monitor and phone as data submitted is directly entered into medical record. Reviewed and confirmed appropriate blood pressure monitoring technique.         Patient's BP goal is less than or equal to 130/80.Patient's BP average is 121/80 mmHg, which is at goal, per 2017 ACC/AHA Hypertension Guidelines.    Explained to the patient that the Digital Medicine team is not available for emergencies. Advised patient call [facility] On [facility] ([phone] or [phone]) or 911 if needed.         Reason for review: Blood pressure at goal        Topics Covered on Call: physical activity and Diet    Additional Follow-up details: About to run about of amlodipine, will probably run out this week, good through Monday at least.                 Diet-Assessed  24 hour dietary recall  Breakfast is typically between. Steel cut oats with fruit or almond milk with cereal.  Lunch is typically between. Salad with protein, beans, vegetarian options.   Dinner is typically between. Plant based options.   Patient reports eating or drinking the following: Changing her dietary habits right now. Trying to do more of a whole foods  diet, plant based. Trying to be more mindful of salt intake.     Coffee and water the rest of the day      Physical Activity-Assessed      Additional physical activity details: TRX at the gym but probably going to switch to something at the house instead of the gym. Was doing [employer]ouch to 5k.       Medication Adherence-Medication adherence was assessed.  Patient continue taking medication as prescribed.          Substance, Sleep, Stress-Assessed  stress-assessed  Details:tends to be an anxious person   Intervention(s):    Sleep-assessed  Details:not a great sleeper, sleep apnea runs in the family  Intervention(s):    Alcohol -assessed  Details:drinks socially   Intervention(s):    Tobacco-Assessed  Details:ex smoker  Intervention(s):          Additional monitoring needed.  Continue current diet/physical activity routine.  Instructed to charge device.  Placed task for clinician. Medication  Reviewed Device Techniques.     Patient verbalizes understanding.      Explained the importance of self-monitoring and medication adherence. Encouraged the patient to communicate with their health  for lifestyle modifications to help improve or maintain a healthy lifestyle.        Sent link to [facility] webpages and my contact information via [employer] for future questions.        Explained to the patient that the Digital Medicine team is not available for emergencies. Advised patient call [facility] ([phone] or [phone]) or 911 if needed.            There are no preventive care reminders to display for this patient.      Last 5 Patient Entered Readings                                      Current 30 Day Average: 121/80     Recent Readings 11/11/2020 11/11/2020 11/11/2020 11/11/2020 11/10/2020    SBP (mmHg) 120 117 131 110 121    DBP (mmHg) 79 73 81 70 85    Pulse 73 71 73 76 72

## 2020-11-12 NOTE — LETTER
November 12, 2020     Timo FRANCO Files  [address] [place] [address]       Dear Timo,    Welcome to [facility]! Our goal is to make care effective, proactive and convenient by using data you send us from home to better treat your chronic conditions.                  My name is Tonya Gonzalez, and I am your dedicated Digital Medicine clinician. As an expert in medication management, I will help ensure that the medications you are taking continue to provide the intended benefits and help you reach your goals. You can reach me directly at [phone] or by sending me a message directly through your [facility] account.      I am Apryl Crump and I will be your health . My job is to help you identify lifestyle changes to improve your disease control. We will talk about nutrition, exercise, and other ways you may be able to adjust your current habits to better your health. Additionally, we will help ensure you are completing the tests and screenings that are necessary to help manage your conditions. You can reach me directly at [phone] or by sending me a message directly through your [facility] account.    Most importantly, YOU are at the center of this team. Together, we will work to improve your overall health and encourage you to meet your goals for a healthier lifestyle.     What we expect from YOU:  · Please take frequent home blood pressure measurements. We ask that you take at least 1 blood pressure reading per week, but more information will better help us get you know you. Be sure you rest for a few minutes before taking the reading in a quiet, comfortable place.     Be available to receive phone calls or [employer] messages, when appropriate, from your care team. Please let us know if there are any specific days or times that work best for us to reach you via phone.     Complete routine tests and screenings. Dont worry, we will help keep you on track!           What you  should expect from your Digital Medicine Care Team:   We will work with you to create a personalized plan of care and provide you with encouragement and education, including regarding lifestyle changes, that could help you manage your disease states.     We will adjust your current medications, if needed, and continue to monitor your long-term progress.     We will provide you and your physician with monthly progress reports after you have been in the program for more than 30 days.     We will send you reminders through [employer] and text messages to help ensure you do not miss any testing deadlines to help manage your disease states.    You will be able to reach us by phone or through your [employer] account by clicking our names under Care Team on the right side of the home screen.    We look forward to working with you to help manage your health,    Sincerely,    Your Digital Medicine Team    Please visit our websites to learn more:   · Hypertension: [email or web address]      Remember, we are not available for emergencies. If you have an emergency, please contact your doctors office directly or call [facility] on-call ([phone] or [phone]) or 911.

## 2020-11-13 ENCOUNTER — PATIENT OUTREACH (OUTPATIENT)
Dept: OTHER | Facility: OTHER | Age: 55
End: 2020-11-13

## 2020-11-13 DIAGNOSIS — I10 ESSENTIAL (PRIMARY) HYPERTENSION: Primary | Chronic | ICD-10-CM

## 2020-11-13 RX ORDER — CHOLECALCIFEROL (VITAMIN D3) 25 MCG
1000 TABLET ORAL DAILY
COMMUNITY

## 2020-11-13 RX ORDER — LANOLIN ALCOHOL/MO/W.PET/CERES
100 CREAM (GRAM) TOPICAL DAILY
COMMUNITY
End: 2022-12-06

## 2020-11-13 RX ORDER — AMLODIPINE BESYLATE 2.5 MG/1
2.5 TABLET ORAL DAILY
Qty: 30 TABLET | Refills: 1 | Status: SHIPPED | OUTPATIENT
Start: 2020-11-13 | End: 2021-01-13 | Stop reason: SDUPTHER

## 2020-11-13 RX ORDER — VITAMIN E 268 MG
400 CAPSULE ORAL DAILY
COMMUNITY
End: 2022-12-06

## 2020-11-13 NOTE — PROGRESS NOTES
Digital Medicine: Clinician Introduction    Timo Camargo is a 55 y.o. female who is newly enrolled in the Digital Medicine Clinic.    Patient reports doing well without concern. Patient previously requested to come off amlodipine (see OV 10/30), however patient decided to continue taking due to concern of BP trending up if she stopped it. Prefers to continue amlodipine for now and reassess. She notes monitoring her sodium intake and working on losing weight.         The history is provided by the patient.      Review of patient's allergies indicates:   -- Penicillins -- Rash   -- Sulfa (sulfonamide antibiotics) -- Rash   -- Vancomycin analogues   Completed Medication Reconciliation  Verified pharmacy information.    HYPERTENSION  Explained hypertension digital medicine goals including BP goal less than or equal to 130/80mmHg, improved convenience of BP management and reduced risk of heart attack, kidney failure, stroke, eye disease, dementia, and death.     Explained non-pharmacologic therapies like low salt diet and physical activity can reduce blood pressure.       Explained that we expect patient to submit several blood pressure readings per week at random times of the day, but at least 30 minutes after taking blood pressure medications. Instructed patient not to allow anyone else to use their blood pressure monitor and phone as data submitted is directly entered into medical record. Reviewed and confirmed appropriate blood pressure monitoring technique.         Reviewed signs/symptoms of hypertension (headache, changes in vision, chest pain, shortness of breath)   Reviewed signs/symptoms of hypotension (lightheaded, dizziness, weakness)     Patient's BP goal is less than or equal to 130/80. Patients BP average is 117/79 mmHg, which is at goal, per 2017 ACC/AHA Hypertension Guidelines.         Last 5 Patient Entered Readings                                      Current 30 Day Average: 117/79     Recent  Readings 11/13/2020 11/12/2020 11/11/2020 11/11/2020 11/11/2020    SBP (mmHg) 115 112 120 117 131    DBP (mmHg) 76 78 79 73 81    Pulse 78 68 73 71 73                Depression Screening  Timo Camargo screened negative on the depression screening.     Sleep Apnea Screening  Patient not previously diagnosed with ORESTES   Pt reports nighttime awakenings, possible snoring, notes strong family hx. Unclear if referreal appropriate at this time, will defer to PCP. .     Medication Affordability Screening  Did not address medication affordability screening.     Medication Adherence-Medication adherence was assessed.  Patient continue taking medication as prescribed.            ASSESSMENT(S)  Patients BP average is 117/79 mmHg, which is at goal. Patient's BP goal is less than or equal to 130/80.    Hypertension Plan  Continue current therapy. Patient controlled on current therapy. Consider discontinuing amlodipine at next outreach if pt ameable and BPs trending low.   Continue current diet/physical activity routine. Monitoring sodium.       Addressed patient questions and patient has my contact information if needed prior to next outreach. Patient verbalizes understanding.      Explained the importance of self-monitoring and medication adherence. Encouraged the patient to communicate with their health  for lifestyle modifications to help improve or maintain a healthy lifestyle.        Sent link to [facility] webpages and my contact information via [employer] for future questions.        Explained to the patient that the Digital Medicine team is not available for emergencies. Advised patient call [facility] ([phone] or [phone]) or [phone] if needed.            There are no preventive care reminders to display for this patient.       Current Medication Regimen:  Hypertension Medications             ramipriL (ALTACE) 5 MG capsule TAKE 1 CAPSULE DAILY

## 2020-12-10 ENCOUNTER — PATIENT OUTREACH (OUTPATIENT)
Dept: OTHER | Facility: OTHER | Age: 55
End: 2020-12-10

## 2020-12-10 NOTE — PROGRESS NOTES
Digital Medicine: Clinician Follow-Up    Spoke with patient regarding HTN Digital Medicine Program.     Current HTN medications:  Amlodipine 2.5 mg daily   Ramipril 5 mg daily     Denies lightheadedness, dizziness, SOB, CP, HA. Denies ADEs to HTN medications. Denies missed doses of HTN medications.     She feels good and feels like her BP is doing well. She notes that she has not been consistent taking BP measurements and will begin setting an alarm to get in a routine.         The history is provided by the patient.   Follow-up reason(s): routine follow up.     Hypertension    Patient's blood pressure is stable.   Patient is not experiencing signs/symptoms of hypotension.  Patient is not experiencing signs/symptoms of hypertension.            Last 5 Patient Entered Readings                                      Current 30 Day Average: 112/76     Recent Readings 12/9/2020 11/30/2020 11/22/2020 11/21/2020 11/19/2020    SBP (mmHg) 109 128 130 101 107    DBP (mmHg) 79 79 70 76 70    Pulse 82 71 67 72 78                 Depression Screening  Did not address depression screening.    Sleep Apnea Screening    Did not address sleep apnea screening.     Medication Affordability Screening  Did not address medication affordability screening.     Medication Adherence-Medication adherence was assessed.          ASSESSMENT(S)  Patients BP average is 112/76 mmHg, which is at goal. Patient's BP goal is less than or equal to 130/80.    BP stable from last outreach. No s/sx hypotension. No changes warranted today.       Hypertension Plan  Continue current therapy.       Addressed patient questions and patient has my contact information if needed prior to next outreach. Patient verbalizes understanding.             There are no preventive care reminders to display for this patient.  There are no preventive care reminders to display for this patient.      Hypertension Medications             amLODIPine (NORVASC) 2.5 MG tablet Take 1  tablet (2.5 mg total) by mouth once daily.    ramipriL (ALTACE) 5 MG capsule TAKE 1 CAPSULE DAILY

## 2020-12-17 ENCOUNTER — PATIENT OUTREACH (OUTPATIENT)
Dept: OTHER | Facility: OTHER | Age: 55
End: 2020-12-17

## 2021-07-28 DIAGNOSIS — Z00.00 ROUTINE GENERAL MEDICAL EXAMINATION AT A HEALTH CARE FACILITY: Primary | ICD-10-CM

## 2021-10-22 ENCOUNTER — CLINICAL SUPPORT (OUTPATIENT)
Dept: INTERNAL MEDICINE | Facility: CLINIC | Age: 56
End: 2021-10-22
Payer: COMMERCIAL

## 2021-10-22 ENCOUNTER — PATIENT MESSAGE (OUTPATIENT)
Dept: INTERNAL MEDICINE | Facility: CLINIC | Age: 56
End: 2021-10-22

## 2021-10-22 ENCOUNTER — OFFICE VISIT (OUTPATIENT)
Dept: INTERNAL MEDICINE | Facility: CLINIC | Age: 56
End: 2021-10-22
Payer: COMMERCIAL

## 2021-10-22 ENCOUNTER — HOSPITAL ENCOUNTER (OUTPATIENT)
Dept: CARDIOLOGY | Facility: CLINIC | Age: 56
Discharge: HOME | End: 2021-10-22
Payer: COMMERCIAL

## 2021-10-22 VITALS
HEIGHT: 64 IN | HEART RATE: 67 BPM | SYSTOLIC BLOOD PRESSURE: 125 MMHG | WEIGHT: 177 LBS | BODY MASS INDEX: 30.22 KG/M2 | DIASTOLIC BLOOD PRESSURE: 84 MMHG

## 2021-10-22 DIAGNOSIS — N39.0 FREQUENT UTI: ICD-10-CM

## 2021-10-22 DIAGNOSIS — Z00.00 ROUTINE GENERAL MEDICAL EXAMINATION AT A HEALTH CARE FACILITY: ICD-10-CM

## 2021-10-22 DIAGNOSIS — E78.00 HYPERCHOLESTEREMIA: ICD-10-CM

## 2021-10-22 DIAGNOSIS — R63.5 RECENT WEIGHT GAIN: ICD-10-CM

## 2021-10-22 DIAGNOSIS — Z00.00 ROUTINE GENERAL MEDICAL EXAMINATION AT A HEALTH CARE FACILITY: Primary | ICD-10-CM

## 2021-10-22 DIAGNOSIS — I10 ESSENTIAL (PRIMARY) HYPERTENSION: Chronic | ICD-10-CM

## 2021-10-22 DIAGNOSIS — Z00.00 ANNUAL PHYSICAL EXAM: Primary | ICD-10-CM

## 2021-10-22 LAB
25(OH)D3+25(OH)D2 SERPL-MCNC: 64 NG/ML (ref 30–96)
ALBUMIN SERPL BCP-MCNC: 3.9 G/DL (ref 3.5–5.2)
ALP SERPL-CCNC: 60 U/L (ref 55–135)
ALT SERPL W/O P-5'-P-CCNC: 16 U/L (ref 10–44)
ANION GAP SERPL CALC-SCNC: 10 MMOL/L (ref 8–16)
AST SERPL-CCNC: 14 U/L (ref 10–40)
BILIRUB SERPL-MCNC: 0.7 MG/DL (ref 0.1–1)
BUN SERPL-MCNC: 12 MG/DL (ref 6–20)
CALCIUM SERPL-MCNC: 9.8 MG/DL (ref 8.7–10.5)
CHLORIDE SERPL-SCNC: 105 MMOL/L (ref 95–110)
CHOLEST SERPL-MCNC: 148 MG/DL (ref 120–199)
CHOLEST/HDLC SERPL: 2.6 {RATIO} (ref 2–5)
CO2 SERPL-SCNC: 24 MMOL/L (ref 23–29)
CREAT SERPL-MCNC: 0.9 MG/DL (ref 0.5–1.4)
ERYTHROCYTE [DISTWIDTH] IN BLOOD BY AUTOMATED COUNT: 11.9 % (ref 11.5–14.5)
EST. GFR  (AFRICAN AMERICAN): >60 ML/MIN/1.73 M^2
EST. GFR  (NON AFRICAN AMERICAN): >60 ML/MIN/1.73 M^2
ESTIMATED AVG GLUCOSE: 105 MG/DL (ref 68–131)
GLUCOSE SERPL-MCNC: 97 MG/DL (ref 70–110)
HBA1C MFR BLD: 5.3 % (ref 4–5.6)
HCT VFR BLD AUTO: 41.1 % (ref 37–48.5)
HDLC SERPL-MCNC: 56 MG/DL (ref 40–75)
HDLC SERPL: 37.8 % (ref 20–50)
HGB BLD-MCNC: 14.2 G/DL (ref 12–16)
LDLC SERPL CALC-MCNC: 79.2 MG/DL (ref 63–159)
MCH RBC QN AUTO: 31.6 PG (ref 27–31)
MCHC RBC AUTO-ENTMCNC: 34.5 G/DL (ref 32–36)
MCV RBC AUTO: 92 FL (ref 82–98)
NONHDLC SERPL-MCNC: 92 MG/DL
PLATELET # BLD AUTO: 266 K/UL (ref 150–450)
PMV BLD AUTO: 10.1 FL (ref 9.2–12.9)
POTASSIUM SERPL-SCNC: 4.4 MMOL/L (ref 3.5–5.1)
PROT SERPL-MCNC: 7.2 G/DL (ref 6–8.4)
RBC # BLD AUTO: 4.49 M/UL (ref 4–5.4)
SODIUM SERPL-SCNC: 139 MMOL/L (ref 136–145)
TRIGL SERPL-MCNC: 64 MG/DL (ref 30–150)
TSH SERPL DL<=0.005 MIU/L-ACNC: 1.52 UIU/ML (ref 0.4–4)
WBC # BLD AUTO: 5.02 K/UL (ref 3.9–12.7)

## 2021-10-22 PROCEDURE — 99999 PR PBB SHADOW E&M-EST. PATIENT-LVL III: CPT | Mod: PBBFAC,,, | Performed by: INTERNAL MEDICINE

## 2021-10-22 PROCEDURE — 90686 IIV4 VACC NO PRSV 0.5 ML IM: CPT | Mod: S$GLB,,, | Performed by: INTERNAL MEDICINE

## 2021-10-22 PROCEDURE — 3044F HG A1C LEVEL LT 7.0%: CPT | Mod: CPTII,S$GLB,, | Performed by: INTERNAL MEDICINE

## 2021-10-22 PROCEDURE — 93005 EKG 12-LEAD: ICD-10-PCS | Mod: S$GLB,,, | Performed by: INTERNAL MEDICINE

## 2021-10-22 PROCEDURE — 3074F SYST BP LT 130 MM HG: CPT | Mod: CPTII,S$GLB,, | Performed by: INTERNAL MEDICINE

## 2021-10-22 PROCEDURE — 99396 PREV VISIT EST AGE 40-64: CPT | Mod: S$GLB,,, | Performed by: INTERNAL MEDICINE

## 2021-10-22 PROCEDURE — 3044F PR MOST RECENT HEMOGLOBIN A1C LEVEL <7.0%: ICD-10-PCS | Mod: CPTII,S$GLB,, | Performed by: INTERNAL MEDICINE

## 2021-10-22 PROCEDURE — 97750 PR PHYSICAL PERFORMANCE TEST: ICD-10-PCS | Mod: S$GLB,,, | Performed by: INTERNAL MEDICINE

## 2021-10-22 PROCEDURE — 83036 HEMOGLOBIN GLYCOSYLATED A1C: CPT | Performed by: INTERNAL MEDICINE

## 2021-10-22 PROCEDURE — 1159F PR MEDICATION LIST DOCUMENTED IN MEDICAL RECORD: ICD-10-PCS | Mod: CPTII,S$GLB,, | Performed by: INTERNAL MEDICINE

## 2021-10-22 PROCEDURE — 3074F PR MOST RECENT SYSTOLIC BLOOD PRESSURE < 130 MM HG: ICD-10-PCS | Mod: CPTII,S$GLB,, | Performed by: INTERNAL MEDICINE

## 2021-10-22 PROCEDURE — 3079F DIAST BP 80-89 MM HG: CPT | Mod: CPTII,S$GLB,, | Performed by: INTERNAL MEDICINE

## 2021-10-22 PROCEDURE — 99999 PR PBB SHADOW E&M-EST. PATIENT-LVL III: ICD-10-PCS | Mod: PBBFAC,,, | Performed by: INTERNAL MEDICINE

## 2021-10-22 PROCEDURE — 80053 COMPREHEN METABOLIC PANEL: CPT | Performed by: INTERNAL MEDICINE

## 2021-10-22 PROCEDURE — 93010 ELECTROCARDIOGRAM REPORT: CPT | Mod: S$GLB,,, | Performed by: INTERNAL MEDICINE

## 2021-10-22 PROCEDURE — 1159F MED LIST DOCD IN RCRD: CPT | Mod: CPTII,S$GLB,, | Performed by: INTERNAL MEDICINE

## 2021-10-22 PROCEDURE — 84443 ASSAY THYROID STIM HORMONE: CPT | Performed by: INTERNAL MEDICINE

## 2021-10-22 PROCEDURE — 4010F PR ACE/ARB THEARPY RXD/TAKEN: ICD-10-PCS | Mod: CPTII,S$GLB,, | Performed by: INTERNAL MEDICINE

## 2021-10-22 PROCEDURE — 4010F ACE/ARB THERAPY RXD/TAKEN: CPT | Mod: CPTII,S$GLB,, | Performed by: INTERNAL MEDICINE

## 2021-10-22 PROCEDURE — 97802 PR MED NUTR THER, 1ST, INDIV, EA 15 MIN: ICD-10-PCS | Mod: S$GLB,,, | Performed by: INTERNAL MEDICINE

## 2021-10-22 PROCEDURE — 90686 FLU VACCINE (QUAD) GREATER THAN OR EQUAL TO 3YO PRESERVATIVE FREE IM: ICD-10-PCS | Mod: S$GLB,,, | Performed by: INTERNAL MEDICINE

## 2021-10-22 PROCEDURE — 97750 PHYSICAL PERFORMANCE TEST: CPT | Mod: S$GLB,,, | Performed by: INTERNAL MEDICINE

## 2021-10-22 PROCEDURE — 1160F RVW MEDS BY RX/DR IN RCRD: CPT | Mod: CPTII,S$GLB,, | Performed by: INTERNAL MEDICINE

## 2021-10-22 PROCEDURE — 1160F PR REVIEW ALL MEDS BY PRESCRIBER/CLIN PHARMACIST DOCUMENTED: ICD-10-PCS | Mod: CPTII,S$GLB,, | Performed by: INTERNAL MEDICINE

## 2021-10-22 PROCEDURE — 93010 EKG 12-LEAD: ICD-10-PCS | Mod: S$GLB,,, | Performed by: INTERNAL MEDICINE

## 2021-10-22 PROCEDURE — 93005 ELECTROCARDIOGRAM TRACING: CPT | Mod: S$GLB,,, | Performed by: INTERNAL MEDICINE

## 2021-10-22 PROCEDURE — 85027 COMPLETE CBC AUTOMATED: CPT | Performed by: INTERNAL MEDICINE

## 2021-10-22 PROCEDURE — 80061 LIPID PANEL: CPT | Performed by: INTERNAL MEDICINE

## 2021-10-22 PROCEDURE — 90471 IMMUNIZATION ADMIN: CPT | Mod: S$GLB,,, | Performed by: INTERNAL MEDICINE

## 2021-10-22 PROCEDURE — 3008F BODY MASS INDEX DOCD: CPT | Mod: CPTII,S$GLB,, | Performed by: INTERNAL MEDICINE

## 2021-10-22 PROCEDURE — 82306 VITAMIN D 25 HYDROXY: CPT | Performed by: INTERNAL MEDICINE

## 2021-10-22 PROCEDURE — 3079F PR MOST RECENT DIASTOLIC BLOOD PRESSURE 80-89 MM HG: ICD-10-PCS | Mod: CPTII,S$GLB,, | Performed by: INTERNAL MEDICINE

## 2021-10-22 PROCEDURE — 3008F PR BODY MASS INDEX (BMI) DOCUMENTED: ICD-10-PCS | Mod: CPTII,S$GLB,, | Performed by: INTERNAL MEDICINE

## 2021-10-22 PROCEDURE — 97802 MEDICAL NUTRITION INDIV IN: CPT | Mod: S$GLB,,, | Performed by: INTERNAL MEDICINE

## 2021-10-22 PROCEDURE — 99396 PR PREVENTIVE VISIT,EST,40-64: ICD-10-PCS | Mod: S$GLB,,, | Performed by: INTERNAL MEDICINE

## 2021-10-22 PROCEDURE — 90471 FLU VACCINE (QUAD) GREATER THAN OR EQUAL TO 3YO PRESERVATIVE FREE IM: ICD-10-PCS | Mod: S$GLB,,, | Performed by: INTERNAL MEDICINE

## 2021-10-22 RX ORDER — AMLODIPINE BESYLATE 2.5 MG/1
2.5 TABLET ORAL DAILY
Qty: 90 TABLET | Refills: 3 | Status: SHIPPED | OUTPATIENT
Start: 2021-10-22 | End: 2022-12-06 | Stop reason: SDUPTHER

## 2021-10-22 RX ORDER — BUPROPION HYDROCHLORIDE 300 MG/1
300 TABLET ORAL DAILY
Qty: 90 TABLET | Refills: 3 | Status: SHIPPED | OUTPATIENT
Start: 2021-10-22 | End: 2022-08-19

## 2021-10-22 RX ORDER — RAMIPRIL 5 MG/1
5 CAPSULE ORAL DAILY
Qty: 90 CAPSULE | Refills: 3 | Status: SHIPPED | OUTPATIENT
Start: 2021-10-22 | End: 2022-10-28 | Stop reason: SDUPTHER

## 2022-01-10 ENCOUNTER — PATIENT MESSAGE (OUTPATIENT)
Dept: INTERNAL MEDICINE | Facility: CLINIC | Age: 57
End: 2022-01-10
Payer: COMMERCIAL

## 2022-01-11 ENCOUNTER — PATIENT MESSAGE (OUTPATIENT)
Dept: INTERNAL MEDICINE | Facility: CLINIC | Age: 57
End: 2022-01-11
Payer: COMMERCIAL

## 2022-08-09 DIAGNOSIS — I10 ESSENTIAL (PRIMARY) HYPERTENSION: Chronic | ICD-10-CM

## 2022-08-10 NOTE — TELEPHONE ENCOUNTER
RF request sent to Pilar West MD for approval of the following medications:    norvasc  altace    rtc 0

## 2022-08-11 RX ORDER — RAMIPRIL 5 MG/1
CAPSULE ORAL
Qty: 90 CAPSULE | Refills: 3 | OUTPATIENT
Start: 2022-08-11

## 2022-08-11 RX ORDER — AMLODIPINE BESYLATE 2.5 MG/1
TABLET ORAL
Qty: 90 TABLET | Refills: 3 | OUTPATIENT
Start: 2022-08-11

## 2022-09-13 PROBLEM — Z12.11 COLON CANCER SCREENING: Status: RESOLVED | Noted: 2019-08-26 | Resolved: 2022-09-13

## 2022-10-07 DIAGNOSIS — Z00.00 ROUTINE GENERAL MEDICAL EXAMINATION AT A HEALTH CARE FACILITY: Primary | ICD-10-CM

## 2022-12-06 PROBLEM — I25.10 MILD CAD: Status: ACTIVE | Noted: 2022-12-06

## 2022-12-06 PROBLEM — R40.0 DAYTIME SOMNOLENCE: Status: ACTIVE | Noted: 2022-12-06

## 2022-12-06 PROBLEM — D71 GRANULOMATOUS DISEASE: Status: ACTIVE | Noted: 2022-12-06

## 2023-01-20 ENCOUNTER — HOSPITAL ENCOUNTER (OUTPATIENT)
Dept: CARDIOLOGY | Facility: CLINIC | Age: 58
Discharge: HOME | End: 2023-01-20
Payer: COMMERCIAL

## 2023-01-20 ENCOUNTER — CLINICAL SUPPORT (OUTPATIENT)
Dept: INTERNAL MEDICINE | Facility: CLINIC | Age: 58
End: 2023-01-20
Payer: COMMERCIAL

## 2023-01-20 ENCOUNTER — OFFICE VISIT (OUTPATIENT)
Dept: INTERNAL MEDICINE | Facility: CLINIC | Age: 58
End: 2023-01-20
Payer: COMMERCIAL

## 2023-01-20 VITALS
DIASTOLIC BLOOD PRESSURE: 76 MMHG | BODY MASS INDEX: 30.39 KG/M2 | SYSTOLIC BLOOD PRESSURE: 111 MMHG | HEART RATE: 79 BPM | HEIGHT: 64 IN | WEIGHT: 178 LBS

## 2023-01-20 DIAGNOSIS — Z00.00 ANNUAL PHYSICAL EXAM: Primary | ICD-10-CM

## 2023-01-20 DIAGNOSIS — E78.5 DYSLIPIDEMIA, GOAL LDL BELOW 100: ICD-10-CM

## 2023-01-20 DIAGNOSIS — D71 GRANULOMATOUS DISEASE: ICD-10-CM

## 2023-01-20 DIAGNOSIS — I10 ESSENTIAL (PRIMARY) HYPERTENSION: Chronic | ICD-10-CM

## 2023-01-20 DIAGNOSIS — E78.00 HYPERCHOLESTEREMIA: Chronic | ICD-10-CM

## 2023-01-20 DIAGNOSIS — Z00.00 ROUTINE GENERAL MEDICAL EXAMINATION AT A HEALTH CARE FACILITY: ICD-10-CM

## 2023-01-20 DIAGNOSIS — F34.1 DYSTHYMIA: ICD-10-CM

## 2023-01-20 LAB
25(OH)D3+25(OH)D2 SERPL-MCNC: 58 NG/ML (ref 30–96)
ALBUMIN SERPL BCP-MCNC: 4.2 G/DL (ref 3.5–5.2)
ALP SERPL-CCNC: 79 U/L (ref 55–135)
ALT SERPL W/O P-5'-P-CCNC: 17 U/L (ref 10–44)
ANION GAP SERPL CALC-SCNC: 9 MMOL/L (ref 8–16)
AST SERPL-CCNC: 16 U/L (ref 10–40)
BASOPHILS # BLD AUTO: 0.03 K/UL (ref 0–0.2)
BASOPHILS # BLD AUTO: 0.03 K/UL (ref 0–0.2)
BASOPHILS NFR BLD: 0.7 % (ref 0–1.9)
BASOPHILS NFR BLD: 0.7 % (ref 0–1.9)
BILIRUB SERPL-MCNC: 0.5 MG/DL (ref 0.1–1)
BUN SERPL-MCNC: 20 MG/DL (ref 6–20)
CALCIUM SERPL-MCNC: 10 MG/DL (ref 8.7–10.5)
CHLORIDE SERPL-SCNC: 103 MMOL/L (ref 95–110)
CHOLEST SERPL-MCNC: 191 MG/DL (ref 120–199)
CHOLEST/HDLC SERPL: 2.7 {RATIO} (ref 2–5)
CO2 SERPL-SCNC: 26 MMOL/L (ref 23–29)
CREAT SERPL-MCNC: 1 MG/DL (ref 0.5–1.4)
DIFFERENTIAL METHOD: NORMAL
DIFFERENTIAL METHOD: NORMAL
EOSINOPHIL # BLD AUTO: 0.1 K/UL (ref 0–0.5)
EOSINOPHIL # BLD AUTO: 0.1 K/UL (ref 0–0.5)
EOSINOPHIL NFR BLD: 2.8 % (ref 0–8)
EOSINOPHIL NFR BLD: 2.8 % (ref 0–8)
ERYTHROCYTE [DISTWIDTH] IN BLOOD BY AUTOMATED COUNT: 11.7 % (ref 11.5–14.5)
ERYTHROCYTE [DISTWIDTH] IN BLOOD BY AUTOMATED COUNT: 11.7 % (ref 11.5–14.5)
EST. GFR  (NO RACE VARIABLE): >60 ML/MIN/1.73 M^2
ESTIMATED AVG GLUCOSE: 108 MG/DL (ref 68–131)
GLUCOSE SERPL-MCNC: 91 MG/DL (ref 70–110)
HBA1C MFR BLD: 5.4 % (ref 4–5.6)
HCT VFR BLD AUTO: 44.8 % (ref 37–48.5)
HCT VFR BLD AUTO: 44.8 % (ref 37–48.5)
HDLC SERPL-MCNC: 70 MG/DL (ref 40–75)
HDLC SERPL: 36.6 % (ref 20–50)
HGB BLD-MCNC: 14.7 G/DL (ref 12–16)
HGB BLD-MCNC: 14.7 G/DL (ref 12–16)
IMM GRANULOCYTES # BLD AUTO: 0.01 K/UL (ref 0–0.04)
IMM GRANULOCYTES # BLD AUTO: 0.01 K/UL (ref 0–0.04)
IMM GRANULOCYTES NFR BLD AUTO: 0.2 % (ref 0–0.5)
IMM GRANULOCYTES NFR BLD AUTO: 0.2 % (ref 0–0.5)
LDLC SERPL CALC-MCNC: 106.2 MG/DL (ref 63–159)
LYMPHOCYTES # BLD AUTO: 1.2 K/UL (ref 1–4.8)
LYMPHOCYTES # BLD AUTO: 1.2 K/UL (ref 1–4.8)
LYMPHOCYTES NFR BLD: 25.9 % (ref 18–48)
LYMPHOCYTES NFR BLD: 25.9 % (ref 18–48)
MCH RBC QN AUTO: 30.2 PG (ref 27–31)
MCH RBC QN AUTO: 30.2 PG (ref 27–31)
MCHC RBC AUTO-ENTMCNC: 32.8 G/DL (ref 32–36)
MCHC RBC AUTO-ENTMCNC: 32.8 G/DL (ref 32–36)
MCV RBC AUTO: 92 FL (ref 82–98)
MCV RBC AUTO: 92 FL (ref 82–98)
MONOCYTES # BLD AUTO: 0.3 K/UL (ref 0.3–1)
MONOCYTES # BLD AUTO: 0.3 K/UL (ref 0.3–1)
MONOCYTES NFR BLD: 6.7 % (ref 4–15)
MONOCYTES NFR BLD: 6.7 % (ref 4–15)
NEUTROPHILS # BLD AUTO: 2.9 K/UL (ref 1.8–7.7)
NEUTROPHILS # BLD AUTO: 2.9 K/UL (ref 1.8–7.7)
NEUTROPHILS NFR BLD: 63.7 % (ref 38–73)
NEUTROPHILS NFR BLD: 63.7 % (ref 38–73)
NONHDLC SERPL-MCNC: 121 MG/DL
NRBC BLD-RTO: 0 /100 WBC
NRBC BLD-RTO: 0 /100 WBC
PLATELET # BLD AUTO: 290 K/UL (ref 150–450)
PLATELET # BLD AUTO: 290 K/UL (ref 150–450)
PMV BLD AUTO: 10 FL (ref 9.2–12.9)
PMV BLD AUTO: 10 FL (ref 9.2–12.9)
POTASSIUM SERPL-SCNC: 4.1 MMOL/L (ref 3.5–5.1)
PROT SERPL-MCNC: 8 G/DL (ref 6–8.4)
RBC # BLD AUTO: 4.87 M/UL (ref 4–5.4)
RBC # BLD AUTO: 4.87 M/UL (ref 4–5.4)
SODIUM SERPL-SCNC: 138 MMOL/L (ref 136–145)
T4 FREE SERPL-MCNC: 1.08 NG/DL (ref 0.71–1.51)
TRIGL SERPL-MCNC: 74 MG/DL (ref 30–150)
TSH SERPL DL<=0.005 MIU/L-ACNC: 2.29 UIU/ML (ref 0.4–4)
WBC # BLD AUTO: 4.6 K/UL (ref 3.9–12.7)
WBC # BLD AUTO: 4.6 K/UL (ref 3.9–12.7)

## 2023-01-20 PROCEDURE — 4010F PR ACE/ARB THEARPY RXD/TAKEN: ICD-10-PCS | Mod: CPTII,S$GLB,, | Performed by: INTERNAL MEDICINE

## 2023-01-20 PROCEDURE — 36415 COLL VENOUS BLD VENIPUNCTURE: CPT | Performed by: SPECIALIST

## 2023-01-20 PROCEDURE — 4010F ACE/ARB THERAPY RXD/TAKEN: CPT | Mod: CPTII,S$GLB,, | Performed by: INTERNAL MEDICINE

## 2023-01-20 PROCEDURE — 99999 PR PBB SHADOW E&M-EST. PATIENT-LVL II: CPT | Mod: PBBFAC,,, | Performed by: INTERNAL MEDICINE

## 2023-01-20 PROCEDURE — 3044F PR MOST RECENT HEMOGLOBIN A1C LEVEL <7.0%: ICD-10-PCS | Mod: CPTII,S$GLB,, | Performed by: INTERNAL MEDICINE

## 2023-01-20 PROCEDURE — 99999 PR PBB SHADOW E&M-EST. PATIENT-LVL II: ICD-10-PCS | Mod: PBBFAC,,, | Performed by: INTERNAL MEDICINE

## 2023-01-20 PROCEDURE — 3074F PR MOST RECENT SYSTOLIC BLOOD PRESSURE < 130 MM HG: ICD-10-PCS | Mod: CPTII,S$GLB,, | Performed by: INTERNAL MEDICINE

## 2023-01-20 PROCEDURE — 3044F HG A1C LEVEL LT 7.0%: CPT | Mod: CPTII,S$GLB,, | Performed by: INTERNAL MEDICINE

## 2023-01-20 PROCEDURE — 85025 COMPLETE CBC W/AUTO DIFF WBC: CPT | Performed by: SPECIALIST

## 2023-01-20 PROCEDURE — 3074F SYST BP LT 130 MM HG: CPT | Mod: CPTII,S$GLB,, | Performed by: INTERNAL MEDICINE

## 2023-01-20 PROCEDURE — 99396 PR PREVENTIVE VISIT,EST,40-64: ICD-10-PCS | Mod: S$GLB,,, | Performed by: INTERNAL MEDICINE

## 2023-01-20 PROCEDURE — 3078F PR MOST RECENT DIASTOLIC BLOOD PRESSURE < 80 MM HG: ICD-10-PCS | Mod: CPTII,S$GLB,, | Performed by: INTERNAL MEDICINE

## 2023-01-20 PROCEDURE — 86480 TB TEST CELL IMMUN MEASURE: CPT | Performed by: SPECIALIST

## 2023-01-20 PROCEDURE — 80053 COMPREHEN METABOLIC PANEL: CPT | Performed by: INTERNAL MEDICINE

## 2023-01-20 PROCEDURE — 83036 HEMOGLOBIN GLYCOSYLATED A1C: CPT | Performed by: INTERNAL MEDICINE

## 2023-01-20 PROCEDURE — 3008F PR BODY MASS INDEX (BMI) DOCUMENTED: ICD-10-PCS | Mod: CPTII,S$GLB,, | Performed by: INTERNAL MEDICINE

## 2023-01-20 PROCEDURE — 84439 ASSAY OF FREE THYROXINE: CPT | Performed by: SPECIALIST

## 2023-01-20 PROCEDURE — 82306 VITAMIN D 25 HYDROXY: CPT | Performed by: INTERNAL MEDICINE

## 2023-01-20 PROCEDURE — 99396 PREV VISIT EST AGE 40-64: CPT | Mod: S$GLB,,, | Performed by: INTERNAL MEDICINE

## 2023-01-20 PROCEDURE — 80061 LIPID PANEL: CPT | Performed by: INTERNAL MEDICINE

## 2023-01-20 PROCEDURE — 84443 ASSAY THYROID STIM HORMONE: CPT | Performed by: INTERNAL MEDICINE

## 2023-01-20 PROCEDURE — 93010 ELECTROCARDIOGRAM REPORT: CPT | Mod: S$GLB,,, | Performed by: INTERNAL MEDICINE

## 2023-01-20 PROCEDURE — 3078F DIAST BP <80 MM HG: CPT | Mod: CPTII,S$GLB,, | Performed by: INTERNAL MEDICINE

## 2023-01-20 PROCEDURE — 3008F BODY MASS INDEX DOCD: CPT | Mod: CPTII,S$GLB,, | Performed by: INTERNAL MEDICINE

## 2023-01-20 PROCEDURE — 86698 HISTOPLASMA ANTIBODY: CPT | Performed by: SPECIALIST

## 2023-01-20 PROCEDURE — 93010 EKG 12-LEAD: ICD-10-PCS | Mod: S$GLB,,, | Performed by: INTERNAL MEDICINE

## 2023-01-21 LAB
GAMMA INTERFERON BACKGROUND BLD IA-ACNC: 0.07 IU/ML
M TB IFN-G CD4+ BCKGRND COR BLD-ACNC: 0.12 IU/ML
MITOGEN IGNF BCKGRD COR BLD-ACNC: 9.42 IU/ML
TB GOLD PLUS: NEGATIVE
TB2 - NIL: 0.03 IU/ML

## 2023-01-22 NOTE — PROGRESS NOTES
Subjective:       Patient ID: Timo Camargo is a 57 y.o. female.    Chief Complaint: Executive Health    Very pleasant 57-year-old female whose  works for [employer] and comes for an executive health physical.  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  She has a past medical history significant for dysthymia, dyslipidemia which is mild and well controlled as well as hypertension.  She is also on hormone replacement therapy with estradiol patch oral progesterone.  Today she had an EKG showed a normal sinus rhythm of 78 essentially a normal EKG.  She also had a lipid profile, CMP, CBC, TSH and hemoglobin A1c and everything was within normal limits.  LDL specifically was 106.    Today she is complaining about her right knee she believes she injured it last week playing volleyball but does have an outside orthopedic surgeon that she follows with.    She did her mammogram in June 2022.  She actually had a recent Pap smear with Dr. Selby  Colonoscopy was in August of 2019 and her next colonoscopy will be due 10 years from then.      Review of Systems   Constitutional:  Negative for activity change, appetite change, chills, diaphoresis, fatigue, fever and unexpected weight change.   HENT:  Negative for nasal congestion, ear pain, mouth sores, postnasal drip, sinus pressure/congestion, sore throat and trouble swallowing.    Eyes:  Negative for pain, redness and visual disturbance.   Respiratory:  Negative for apnea, cough, chest tightness, shortness of breath and wheezing.    Cardiovascular:  Negative for chest pain, palpitations and leg swelling.   Gastrointestinal:  Negative for abdominal distention, abdominal pain, blood in stool, constipation, diarrhea, nausea and vomiting.   Endocrine: Negative for cold intolerance, polydipsia, polyphagia and polyuria.   Genitourinary:  Negative for difficulty urinating, dysuria, flank pain, frequency, hematuria, menstrual problem, pelvic pain and  urgency.   Musculoskeletal:  Positive for arthralgias. Negative for back pain, joint swelling and neck pain.   Integumentary:  Negative for color change, rash and wound.   Neurological:  Negative for dizziness, tremors, seizures, syncope, weakness, light-headedness, numbness and headaches.   Hematological:  Negative for adenopathy. Does not bruise/bleed easily.   Psychiatric/Behavioral:  Negative for confusion, decreased concentration, dysphoric mood, hallucinations, self-injury, sleep disturbance and suicidal ideas. The patient is not nervous/anxious.        Objective:      Physical Exam  Cardiovascular:      Rate and Rhythm: Normal rate and regular rhythm.      Heart sounds: No murmur heard.  Pulmonary:      Effort: Pulmonary effort is normal.      Breath sounds: Normal breath sounds. No rales.   Abdominal:      General: Bowel sounds are normal. There is no distension.      Palpations: Abdomen is soft.      Tenderness: There is no abdominal tenderness.   Musculoskeletal:      Cervical back: Normal range of motion and neck supple.      Right knee: Tenderness present.   Neurological:      Mental Status: She is alert and oriented to person, place, and time.       Assessment/plan       Annual physical exam     Health Maintenance   Topic Date Due    High Dose Statin  On it ; goal LDL obtained with low dose    Mammogram  06/30/2023    Lipid Panel  01/20/2028    TETANUS VACCINE  04/25/2029    Hepatitis C Screening  Completed     Health Maintenance Reviewed- colonoscopy due 2029    Essential (primary) hypertension  Comments:  Continue with amlodipine 2.5 mg as well as ramipril 5 mg daily.    Dyslipidemia, goal LDL below 100  Comments:  LDL at goal with low-dose simvastatin 10 mg, I would continue.    Dysthymia  Comments:  controlled with Wellbutrin and to some degree hormone replacement therapy.

## 2023-01-25 LAB
H CAPSUL AB SER QL ID: NEGATIVE
H CAPSUL MYC AB SER QL CF: NEGATIVE
H CAPSUL YST AB SER QL CF: NEGATIVE

## 2023-11-30 DIAGNOSIS — Z00.00 ROUTINE MEDICAL EXAM: Primary | ICD-10-CM

## 2024-01-25 DIAGNOSIS — Z00.00 ROUTINE MEDICAL EXAM: Primary | ICD-10-CM

## 2024-01-26 ENCOUNTER — HOSPITAL ENCOUNTER (OUTPATIENT)
Dept: CARDIOLOGY | Facility: CLINIC | Age: 59
Discharge: HOME | End: 2024-01-26
Payer: COMMERCIAL

## 2024-01-26 ENCOUNTER — CLINICAL SUPPORT (OUTPATIENT)
Dept: INTERNAL MEDICINE | Facility: CLINIC | Age: 59
End: 2024-01-26
Payer: COMMERCIAL

## 2024-01-26 ENCOUNTER — OFFICE VISIT (OUTPATIENT)
Dept: INTERNAL MEDICINE | Facility: CLINIC | Age: 59
End: 2024-01-26
Payer: COMMERCIAL

## 2024-01-26 ENCOUNTER — HOSPITAL ENCOUNTER (OUTPATIENT)
Dept: RADIOLOGY | Facility: CLINIC | Age: 59
Discharge: HOME | End: 2024-01-26
Attending: INTERNAL MEDICINE
Payer: COMMERCIAL

## 2024-01-26 VITALS
BODY MASS INDEX: 29.42 KG/M2 | HEART RATE: 87 BPM | OXYGEN SATURATION: 97 % | DIASTOLIC BLOOD PRESSURE: 82 MMHG | SYSTOLIC BLOOD PRESSURE: 128 MMHG | WEIGHT: 172.31 LBS | HEIGHT: 64 IN

## 2024-01-26 DIAGNOSIS — I25.10 MILD CAD: ICD-10-CM

## 2024-01-26 DIAGNOSIS — B00.9 RECURRENT HSV (HERPES SIMPLEX VIRUS): ICD-10-CM

## 2024-01-26 DIAGNOSIS — Z23 NEED FOR VACCINATION: ICD-10-CM

## 2024-01-26 DIAGNOSIS — Z79.890 HORMONE REPLACEMENT THERAPY, POSTMENOPAUSAL: ICD-10-CM

## 2024-01-26 DIAGNOSIS — Z00.00 ROUTINE GENERAL MEDICAL EXAMINATION AT A HEALTH CARE FACILITY: Primary | ICD-10-CM

## 2024-01-26 DIAGNOSIS — Z13.820 OSTEOPOROSIS SCREENING: ICD-10-CM

## 2024-01-26 DIAGNOSIS — F32.9 REACTIVE DEPRESSION: Chronic | ICD-10-CM

## 2024-01-26 DIAGNOSIS — Z00.00 ANNUAL PHYSICAL EXAM: Primary | ICD-10-CM

## 2024-01-26 DIAGNOSIS — D71 GRANULOMATOUS DISEASE: ICD-10-CM

## 2024-01-26 DIAGNOSIS — Z00.00 ROUTINE MEDICAL EXAM: ICD-10-CM

## 2024-01-26 DIAGNOSIS — Z00.00 HEALTH MAINTENANCE EXAMINATION: ICD-10-CM

## 2024-01-26 DIAGNOSIS — R21 SKIN RASH: ICD-10-CM

## 2024-01-26 DIAGNOSIS — E78.00 HYPERCHOLESTEREMIA: Chronic | ICD-10-CM

## 2024-01-26 DIAGNOSIS — R93.1 AGATSTON CAC SCORE, <100: ICD-10-CM

## 2024-01-26 DIAGNOSIS — R94.31 ABNORMAL EKG: ICD-10-CM

## 2024-01-26 DIAGNOSIS — I10 ESSENTIAL (PRIMARY) HYPERTENSION: Chronic | ICD-10-CM

## 2024-01-26 DIAGNOSIS — E78.00 HYPERCHOLESTEROLEMIA: Chronic | ICD-10-CM

## 2024-01-26 DIAGNOSIS — N39.0 RECURRENT UTI: ICD-10-CM

## 2024-01-26 PROBLEM — Z87.891 HISTORY OF TOBACCO USE DISORDER: Status: ACTIVE | Noted: 2024-01-26

## 2024-01-26 PROBLEM — Z87.891 HISTORY OF TOBACCO USE DISORDER: Status: RESOLVED | Noted: 2024-01-26 | Resolved: 2024-01-26

## 2024-01-26 PROBLEM — S64.40XA DIGITAL NERVE LACERATION, FINGER: Status: RESOLVED | Noted: 2019-04-29 | Resolved: 2024-01-26

## 2024-01-26 PROBLEM — R40.0 DAYTIME SOMNOLENCE: Status: RESOLVED | Noted: 2022-12-06 | Resolved: 2024-01-26

## 2024-01-26 PROBLEM — N85.9 DISORDER OF ENDOMETRIUM: Status: RESOLVED | Noted: 2024-01-26 | Resolved: 2024-01-26

## 2024-01-26 LAB
25(OH)D3+25(OH)D2 SERPL-MCNC: 49 NG/ML (ref 30–96)
ALBUMIN SERPL BCP-MCNC: 3.8 G/DL (ref 3.5–5.2)
ALP SERPL-CCNC: 71 U/L (ref 55–135)
ALT SERPL W/O P-5'-P-CCNC: 14 U/L (ref 10–44)
ANION GAP SERPL CALC-SCNC: 6 MMOL/L (ref 8–16)
AST SERPL-CCNC: 13 U/L (ref 10–40)
BILIRUB SERPL-MCNC: 0.5 MG/DL (ref 0.1–1)
BUN SERPL-MCNC: 16 MG/DL (ref 6–20)
CALCIUM SERPL-MCNC: 9.3 MG/DL (ref 8.7–10.5)
CHLORIDE SERPL-SCNC: 108 MMOL/L (ref 95–110)
CHOLEST SERPL-MCNC: 185 MG/DL (ref 120–199)
CHOLEST/HDLC SERPL: 3 {RATIO} (ref 2–5)
CO2 SERPL-SCNC: 26 MMOL/L (ref 23–29)
CREAT SERPL-MCNC: 0.8 MG/DL (ref 0.5–1.4)
CRP SERPL-MCNC: 0.4 MG/L (ref 0–8.2)
ERYTHROCYTE [DISTWIDTH] IN BLOOD BY AUTOMATED COUNT: 11.9 % (ref 11.5–14.5)
ERYTHROCYTE [SEDIMENTATION RATE] IN BLOOD BY PHOTOMETRIC METHOD: 15 MM/HR (ref 0–36)
EST. GFR  (NO RACE VARIABLE): >60 ML/MIN/1.73 M^2
ESTIMATED AVG GLUCOSE: 108 MG/DL (ref 68–131)
GLUCOSE SERPL-MCNC: 90 MG/DL (ref 70–110)
HBA1C MFR BLD: 5.4 % (ref 4–5.6)
HCT VFR BLD AUTO: 40.3 % (ref 37–48.5)
HDLC SERPL-MCNC: 62 MG/DL (ref 40–75)
HDLC SERPL: 33.5 % (ref 20–50)
HGB BLD-MCNC: 13.5 G/DL (ref 12–16)
LDLC SERPL CALC-MCNC: 106 MG/DL (ref 63–159)
MCH RBC QN AUTO: 30.3 PG (ref 27–31)
MCHC RBC AUTO-ENTMCNC: 33.5 G/DL (ref 32–36)
MCV RBC AUTO: 91 FL (ref 82–98)
NONHDLC SERPL-MCNC: 123 MG/DL
PLATELET # BLD AUTO: 271 K/UL (ref 150–450)
PMV BLD AUTO: 10 FL (ref 9.2–12.9)
POTASSIUM SERPL-SCNC: 4.4 MMOL/L (ref 3.5–5.1)
PROT SERPL-MCNC: 6.9 G/DL (ref 6–8.4)
RBC # BLD AUTO: 4.45 M/UL (ref 4–5.4)
SODIUM SERPL-SCNC: 140 MMOL/L (ref 136–145)
T4 FREE SERPL-MCNC: 0.93 NG/DL (ref 0.71–1.51)
TRIGL SERPL-MCNC: 85 MG/DL (ref 30–150)
TSH SERPL DL<=0.005 MIU/L-ACNC: 2.21 UIU/ML (ref 0.4–4)
WBC # BLD AUTO: 4.55 K/UL (ref 3.9–12.7)

## 2024-01-26 PROCEDURE — 86039 ANTINUCLEAR ANTIBODIES (ANA): CPT | Performed by: SPECIALIST

## 2024-01-26 PROCEDURE — 97750 PHYSICAL PERFORMANCE TEST: CPT | Mod: GP,S$GLB,, | Performed by: INTERNAL MEDICINE

## 2024-01-26 PROCEDURE — 83036 HEMOGLOBIN GLYCOSYLATED A1C: CPT | Performed by: EMERGENCY MEDICINE

## 2024-01-26 PROCEDURE — 80061 LIPID PANEL: CPT | Performed by: EMERGENCY MEDICINE

## 2024-01-26 PROCEDURE — 85652 RBC SED RATE AUTOMATED: CPT | Performed by: SPECIALIST

## 2024-01-26 PROCEDURE — 84443 ASSAY THYROID STIM HORMONE: CPT | Performed by: EMERGENCY MEDICINE

## 2024-01-26 PROCEDURE — 77080 DXA BONE DENSITY AXIAL: CPT | Mod: TC

## 2024-01-26 PROCEDURE — 3008F BODY MASS INDEX DOCD: CPT | Mod: CPTII,S$GLB,, | Performed by: STUDENT IN AN ORGANIZED HEALTH CARE EDUCATION/TRAINING PROGRAM

## 2024-01-26 PROCEDURE — 93010 ELECTROCARDIOGRAM REPORT: CPT | Mod: S$GLB,,, | Performed by: INTERNAL MEDICINE

## 2024-01-26 PROCEDURE — 99999 PR PBB SHADOW E&M-EST. PATIENT-LVL I: CPT | Mod: PBBFAC,,,

## 2024-01-26 PROCEDURE — 86140 C-REACTIVE PROTEIN: CPT | Performed by: SPECIALIST

## 2024-01-26 PROCEDURE — 85027 COMPLETE CBC AUTOMATED: CPT | Performed by: EMERGENCY MEDICINE

## 2024-01-26 PROCEDURE — 82306 VITAMIN D 25 HYDROXY: CPT | Performed by: EMERGENCY MEDICINE

## 2024-01-26 PROCEDURE — 99396 PREV VISIT EST AGE 40-64: CPT | Mod: S$GLB,,, | Performed by: STUDENT IN AN ORGANIZED HEALTH CARE EDUCATION/TRAINING PROGRAM

## 2024-01-26 PROCEDURE — 4010F ACE/ARB THERAPY RXD/TAKEN: CPT | Mod: CPTII,S$GLB,, | Performed by: STUDENT IN AN ORGANIZED HEALTH CARE EDUCATION/TRAINING PROGRAM

## 2024-01-26 PROCEDURE — 1159F MED LIST DOCD IN RCRD: CPT | Mod: CPTII,S$GLB,, | Performed by: STUDENT IN AN ORGANIZED HEALTH CARE EDUCATION/TRAINING PROGRAM

## 2024-01-26 PROCEDURE — 77080 DXA BONE DENSITY AXIAL: CPT | Mod: 26,,, | Performed by: INTERNAL MEDICINE

## 2024-01-26 PROCEDURE — 93005 ELECTROCARDIOGRAM TRACING: CPT | Mod: S$GLB,,, | Performed by: STUDENT IN AN ORGANIZED HEALTH CARE EDUCATION/TRAINING PROGRAM

## 2024-01-26 PROCEDURE — 97802 MEDICAL NUTRITION INDIV IN: CPT | Mod: S$GLB,,,

## 2024-01-26 PROCEDURE — 99999 PR PBB SHADOW E&M-EST. PATIENT-LVL IV: CPT | Mod: PBBFAC,,, | Performed by: STUDENT IN AN ORGANIZED HEALTH CARE EDUCATION/TRAINING PROGRAM

## 2024-01-26 PROCEDURE — 84439 ASSAY OF FREE THYROXINE: CPT | Performed by: SPECIALIST

## 2024-01-26 PROCEDURE — 3074F SYST BP LT 130 MM HG: CPT | Mod: CPTII,S$GLB,, | Performed by: STUDENT IN AN ORGANIZED HEALTH CARE EDUCATION/TRAINING PROGRAM

## 2024-01-26 PROCEDURE — 3044F HG A1C LEVEL LT 7.0%: CPT | Mod: CPTII,S$GLB,, | Performed by: STUDENT IN AN ORGANIZED HEALTH CARE EDUCATION/TRAINING PROGRAM

## 2024-01-26 PROCEDURE — 86235 NUCLEAR ANTIGEN ANTIBODY: CPT | Mod: 59 | Performed by: SPECIALIST

## 2024-01-26 PROCEDURE — 3079F DIAST BP 80-89 MM HG: CPT | Mod: CPTII,S$GLB,, | Performed by: STUDENT IN AN ORGANIZED HEALTH CARE EDUCATION/TRAINING PROGRAM

## 2024-01-26 PROCEDURE — 86038 ANTINUCLEAR ANTIBODIES: CPT | Performed by: SPECIALIST

## 2024-01-26 PROCEDURE — 1160F RVW MEDS BY RX/DR IN RCRD: CPT | Mod: CPTII,S$GLB,, | Performed by: STUDENT IN AN ORGANIZED HEALTH CARE EDUCATION/TRAINING PROGRAM

## 2024-01-26 PROCEDURE — 80053 COMPREHEN METABOLIC PANEL: CPT | Performed by: EMERGENCY MEDICINE

## 2024-01-26 NOTE — PROGRESS NOTES
[facility] EXECUTIVE HEALTH EXAM    CHIEF COMPLAINT: Executive health exam    HISTORY OF PRESENT ILLNESS: Timo Camargo is a 58 y.o. female who presents here today for an executive health examination. Patient's spouse is an employee of [employer]. She works as a PT. Patient denies any acute concerns today.    PAST MEDICAL HISTORY:  Past Medical History:   Diagnosis Date    Agatston CAC score, <100 01/26/2024    Essential (primary) hypertension 06/19/2019    Granulomatous disease 12/06/2022    History of tobacco use disorder 01/26/2024    Hormone replacement therapy, postmenopausal 01/26/2024    Hypercholesterolemia 06/19/2019    Reactive depression 06/19/2019    Recurrent HSV (herpes simplex virus) 01/26/2024    Recurrent UTI 01/26/2024       MEDICATIONS:    Current Outpatient Medications:     amLODIPine (NORVASC) 2.5 MG tablet, TAKE 1 TABLET ONCE DAILY, Disp: 90 tablet, Rfl: 3    aspirin (ECOTRIN) 81 MG EC tablet, Take 81 mg by mouth once daily., Disp: , Rfl:     buPROPion (WELLBUTRIN XL) 300 MG 24 hr tablet, TAKE 1 TABLET ONCE DAILY, Disp: 90 tablet, Rfl: 3    estradiol 0.05 mg/24 hr td ptsw (VIVELLE-DOT) 0.05 mg/24 hr, Place 1 patch onto the skin twice a week., Disp: 24 patch, Rfl: 3    MULTIVIT-IRON-MIN-FOLIC ACID 3,500-18-0.4 UNIT-MG-MG ORAL CHEW, Take by mouth once daily., Disp: , Rfl:     nitrofurantoin (MACRODANTIN) 100 MG capsule, Take 1 capsule (100 mg total) by mouth once daily., Disp: 90 capsule, Rfl: 1    PROGESTERONE MICRONIZED ORAL, Take 300 mg by mouth once daily., Disp: , Rfl:     ramipriL (ALTACE) 5 MG capsule, TAKE 1 CAPSULE ONCE DAILY, Disp: 90 capsule, Rfl: 3    simvastatin (ZOCOR) 10 MG tablet, TAKE 1 TABLET ONCE DAILY, Disp: 90 tablet, Rfl: 1    valACYclovir (VALTREX) 1000 MG tablet, Take 1 tablet (1,000 mg total) by mouth every 12 (twelve) hours., Disp: 14 tablet, Rfl: 3    vitamin D (VITAMIN D3) 1000 units Tab, Take 1,000 Units by mouth once daily., Disp: , Rfl:     flu vacc [id number]  6mos up,PF, (FLUARIX QUAD [id number], PF,) 60 mcg (15 mcg x 4)/0.5 mL Syrg, Inject 0.5 mLs into the muscle once. for 1 dose, Disp: 0.5 mL, Rfl: 0    varicella-zoster [id number], PF, (SHINGRIX, PF,) 50 mcg/0.5 mL injection, Inject 0.5 mLs into the muscle once. for 1 dose, Disp: 1 each, Rfl: 1      PAST SURGICAL HISTORY:  Past Surgical History:   Procedure Laterality Date     SECTION N/A      SECTION N/A     COLONOSCOPY N/A 2019    Procedure: COLONOSCOPY;  Surgeon: Nahun Burciaga MD;  Location: [facility];  Service: Endoscopy;  Laterality: N/A;    OVARIAN CYST REMOVAL      REPAIR OF MENISCUS OF KNEE Right 2023    REPAIR OF MENISCUS OF KNEE Left        SOCIAL HISTORY:  Social History     Tobacco Use    Smoking status: Former     Current packs/day: 0.00     Average packs/day: 1 pack/day for 15.0 years (15.0 ttl pk-yrs)     Types: Cigarettes     Start date: 1/3/1990     Quit date: 1/3/2005     Years since quittin.0    Smokeless tobacco: Never   Substance Use Topics    Alcohol use: Yes     Comment: social drinker    Drug use: No       ALLERGIES:  Review of patient's allergies indicates:   Allergen Reactions    Penicillins Rash    Sulfa (sulfonamide antibiotics) Rash    Vancomycin analogues        FAMILY HISTORY:  Family History   Problem Relation Age of Onset    Hypertension Mother     Dementia Mother 70        senile dementia    Coronary artery disease Father 59        cabg    Hypertension Father     No Known Problems Sister     No Known Problems Sister     No Known Problems Sister     No Known Problems Brother     No Known Problems Brother     Pancreatic cancer Maternal Grandmother     No Known Problems Maternal Grandfather     No Known Problems Paternal Grandmother     Cancer Paternal Grandfather     No Known Problems Son     No Known Problems Son     Cancer Maternal Aunt         breast    Cancer Paternal Aunt         breast       PREVENTATIVE HEALTH:     IMMUNIZATIONS:    "                                                           Influenza: requested  COVID: yes x 4  Tdap: last in 2019, updated not indicated  HPV: not indicated  Shingles: requested  Pneumonia: not indicated    SCREENINGS        Cervical cancer: last in 2022; updated not indicated; follows with outside gynecologist annually  Breast cancer: last in 12/2023 birads 2;  updated due 12/2024  Colon cancer:  normal colonoscopy 2019, next due 2029  Lung cancer: not indicated  HIV: negative 2016  Hepatitis C: negative 2017  STI: not indicated  Diabetes: A1c today  Lipids: Lipid panel today  AAA: not indicated  Bone density: not indicated  Anxiety and depression: managed well with wellbutrin    LIFESTYLE         Exercise: a few days per week - TRX, cardio  Diet: "could be better but no horrible"  Substance use: as above  Employment: works as PT  Family & living situation: Lives in [place]    INTERVENTIONS        Statin: yes  Aspirin: yes      PHYSICAL EXAM:    /82 (BP Location: Right arm, Patient Position: Sitting)   Pulse 87   Ht 5' 4" (1.626 m)   Wt 78.1 kg (172 lb 4.6 oz)   SpO2 97%   BMI 29.57 kg/m²     Physical Exam  Vitals and nursing note reviewed.   Constitutional:       General: She is not in acute distress.     Appearance: Normal appearance. She is not ill-appearing, toxic-appearing or diaphoretic.   HENT:      Head: Normocephalic and atraumatic.      Nose: Nose normal.   Eyes:      Extraocular Movements: Extraocular movements intact.      Conjunctiva/sclera: Conjunctivae normal.      Pupils: Pupils are equal, round, and reactive to light.   Cardiovascular:      Rate and Rhythm: Normal rate and regular rhythm.      Heart sounds: Normal heart sounds. No murmur heard.  Pulmonary:      Effort: Pulmonary effort is normal. No respiratory distress.      Breath sounds: Normal breath sounds. No wheezing.   Musculoskeletal:         General: No deformity. Normal range of motion.   Skin:     Findings: No lesion or rash. "   Neurological:      General: No focal deficit present.      Mental Status: She is alert.      Motor: No weakness.      Gait: Gait normal.   Psychiatric:         Mood and Affect: Mood normal.         Behavior: Behavior normal.         Thought Content: Thought content normal.         Judgment: Judgment normal.            LAB REVIEW:    Lab Results   Component Value Date     01/26/2024    K 4.4 01/26/2024     01/26/2024    CO2 26 01/26/2024    BUN 16 01/26/2024    CREATININE 0.8 01/26/2024    CALCIUM 9.3 01/26/2024    ANIONGAP 6 (L) 01/26/2024    EGFRNORACEVR >60.0 01/26/2024       Lab Results   Component Value Date    ALT 14 01/26/2024    AST 13 01/26/2024    ALKPHOS 71 01/26/2024    BILITOT 0.5 01/26/2024       Lab Results   Component Value Date    WBC 4.55 01/26/2024    HGB 13.5 01/26/2024    HCT 40.3 01/26/2024    MCV 91 01/26/2024     01/26/2024       Lab Results   Component Value Date    TSH 2.212 01/26/2024       Lab Results   Component Value Date    HGBA1C 5.4 01/26/2024       Cholesterol   Date Value Ref Range Status   01/26/2024 185 120 - 199 mg/dL Final     Comment:     The National Cholesterol Education Program (NCEP) has set the  following guidelines (reference ranges) for Cholesterol:  Optimal.....................<200 mg/dL  Borderline High.............200-239 mg/dL  High........................> or = 240 mg/dL       HDL   Date Value Ref Range Status   01/26/2024 62 40 - 75 mg/dL Final     Comment:     The National Cholesterol Education Program (NCEP) has set the  following guidelines (reference values) for HDL Cholesterol:  Low...............<40 mg/dL  Optimal...........>60 mg/dL       LDL Cholesterol   Date Value Ref Range Status   01/26/2024 106.0 63.0 - 159.0 mg/dL Final     Comment:     The National Cholesterol Education Program (NCEP) has set the  following guidelines (reference values) for LDL Cholesterol:  Optimal.......................<130 mg/dL  Borderline  High...............130-159 mg/dL  High..........................160-189 mg/dL  Very High.....................>190 mg/dL       Triglycerides   Date Value Ref Range Status   01/26/2024 85 30 - 150 mg/dL Final     Comment:     The National Cholesterol Education Program (NCEP) has set the  following guidelines (reference values) for triglycerides:  Normal......................<150 mg/dL  Borderline High.............150-199 mg/dL  High........................200-499 mg/dL         The 10-year ASCVD risk score (Geneva WARREN, et al., 2019) is: 2.5%    Values used to calculate the score:      Age: 58 years      Sex: Female      Is Non- : No      Diabetic: No      Tobacco smoker: No      Systolic Blood Pressure: 128 mmHg      Is BP treated: Yes      HDL Cholesterol: 62 mg/dL      Total Cholesterol: 185 mg/dL        IMAGING:    DXA SCAN  Completed today. Result pending.    EKG    Results for orders placed or performed during the hospital encounter of 01/26/24   EKG 12-lead    Collection Time: 01/26/24  8:52 AM    Narrative    Test Reason : Z00.00,    Vent. Rate : 080 BPM     Atrial Rate : 080 BPM     P-R Int : 146 ms          QRS Dur : 066 ms      QT Int : 352 ms       P-R-T Axes : 073 054 065 degrees     QTc Int : 405 ms    Baseline wander  Normal sinus rhythm  Low voltage, precordial leads  Septal infarct ,age undetermined  Abnormal ECG  When compared with ECG of 20-JAN-2023 08:27,  Criteria for Septal infarct Now present May be due to lead misplacement  Clinical Correlation Required  Confirmed by Catrachito Davenport MD ([id number]) on 1/26/2024 12:45:43 PM    Referred By: JEFFREY ROBLERO           Confirmed By:Catrachito Davenport MD             ASSESSMENT & PLAN:    Timo was seen today for annual exam.    Diagnoses and all orders for this visit:    Annual physical exam  Health maintenance examination  Health screenings and immunizations due as below  History, physical exam findings, imaging results, and lab results are all  "acceptable with exception of what is detailed below    Essential (primary) hypertension  Controlled. Asymptomatic.   Continue current regimen: amlodipine 2.5 mg, ramipril 5 mg    Hypercholesterolemia  Agatston CAC score, <100  CAC score 62 in 2015. Per PCP note, updated CAC score in 2022 shows "progression" but unclear what score  Lipid panel within normal limits today. Hypercholesterolemia in the past.  Continue simvastatin 10 mg + aspirin 81 mg    Reactive depression  Well controlled with bupropion 300 mg daily - continue    Granulomatous disease  CT calcium scan 2015 with incidental finding: Granulomatous calcifications within the mediastinum, bilateral hilar regions and the right middle lobe.   Continue F/U with PCP    Recurrent HSV (herpes simplex virus)  Well controlled with PRN valacyclovir - continue    Recurrent UTI  Well controlled with daily nitrofurantoin - continue    Hormone replacement therapy, postmenopausal  Patient is on estrogen patch + oral progesterone. Symptoms well managed.   Continue F/U with outside provider for further management.     BMI 29.0-29.9,adult  Body mass index is 29.57 kg/m². Weight loss ~6 lbs since last year.   Patient is trying to increase physical activity, eat plant-based diet, and lose weight.  Patient counseled on maintaining healthy diet and regular physical activity.     Osteoporosis screening  DXA done today - result pending. Will update patient when available.     Abnormal EKG  Criteria for septal infarct, age undetermined, on EKG today which is a new finding.   Patient is asymptomatic so suspect lead misplacement as etiology.   No further evaluation unless persistent and/or patient becomes symptomatic.     Need for vaccination  Influenza and Shingles vaccine(s) ordered to be completed today      Return to clinic in 12 months or sooner as needed.          Valarie Wilhelm MD  [facility]  01/26/2024          "

## 2024-01-26 NOTE — LETTER
January 26, 2024    Timo JOAN Files  [address] [place] [address]             Ken Wong [facility] Bld[facility]  [address]  [facility] [address]  Phone: [phone]  Fax: [phone] Dear  Files:      It was a pleasure seeing you in clinic for your Executive Health exam on 1/26/2024. Enclosed is a copy of the clinic note detailing the history, physical exam findings, laboratory studies, and recommendations at that time. Thank you for allowing me to participate in your medical care.             If you have any questions or concerns, please don't hesitate to call.    Sincerely,        Valarie Wilhelm MD

## 2024-01-26 NOTE — PROGRESS NOTES
"Nutrition Assessment  Session Time:  30 minutes      Client name:  Timo Camargo  :  1965  Age:  58 y.o.  Gender:  female    Client states:  A very pleasant Mr. Camargo is here for her annual Executive Health physical. 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 She has a medical history of HTN, hyperlipidemia, depression. Her sleep is not great; she snores and wakes up often during the night. She has been tested for ORESTES and it was negative. Patient has always been concerned with her weight and is a chronic dieter. Her mother was diagnosed with dementia, and over the past year she has been following "the Mind Diet" with her, which is basically a combination of the Mediterranean diet and the DASH diet. She eats very little red meat and chicken, instead having bean and shrimp dishes. She does not eat many non-starchy vegetables. She had stopped exercising when COVID started, and 6 weeks ago, she and her  started doing a TRX weight resistance class for 40 minutes twice a week. Encouraged her to add more cardio into her exercise regimen.    Anthropometrics  Height:  63.5"     Weight:  170.9 lbs  BMI:  29.86  % Body Fat:  41.6    Clinical Signs/Symptoms  N/V/D:  none  Appetite:  good       Past Medical History:   Diagnosis Date    Agatston CAC score, <100 2024    Essential (primary) hypertension 2019    Granulomatous disease 2022    History of tobacco use disorder 2024    Hormone replacement therapy, postmenopausal 2024    Hypercholesterolemia 2019    Reactive depression 2019    Recurrent HSV (herpes simplex virus) 2024    Recurrent UTI 2024       Past Surgical History:   Procedure Laterality Date     SECTION N/A      SECTION N/A     COLONOSCOPY N/A 2019    Procedure: COLONOSCOPY;  Surgeon: Nahun Burciaag MD;  Location: [facility];  Service: Endoscopy;  Laterality: " N/A;    OVARIAN CYST REMOVAL      REPAIR OF MENISCUS OF KNEE Right 2023    REPAIR OF MENISCUS OF KNEE Left        Medications    has a current medication list which includes the following prescription(s): amlodipine, aspirin, bupropion, estradiol 0.05 mg/24 hr td ptsw, fluarix quad [id number] (pf), multivit-iron-min-folic acid, nitrofurantoin, progesterone, micronized, ramipril, simvastatin, valacyclovir, shingrix (pf), and vitamin d.    Vitamins, Minerals, and/or Supplements:  MVI, vitamin D3     Food/Medication Interactions:  Reviewed     Food Allergies or Intolerances:  none     Social History    Marital status:    Employment:  [employer] spouse    Social History     Tobacco Use    Smoking status: Former     Current packs/day: 0.00     Average packs/day: 1 pack/day for 15.0 years (15.0 ttl pk-yrs)     Types: Cigarettes     Start date: 1/3/1990     Quit date: 1/3/2005     Years since quittin.0    Smokeless tobacco: Never   Substance Use Topics    Alcohol use: Yes     Comment: social drinker        Lab Reports   Sodium   Date Value Ref Range Status   2024 140 136 - 145 mmol/L Final     Potassium   Date Value Ref Range Status   2024 4.4 3.5 - 5.1 mmol/L Final     Chloride   Date Value Ref Range Status   2024 108 95 - 110 mmol/L Final     CO2   Date Value Ref Range Status   2024 26 23 - 29 mmol/L Final     Glucose   Date Value Ref Range Status   2024 90 70 - 110 mg/dL Final     BUN   Date Value Ref Range Status   2024 16 6 - 20 mg/dL Final     Creatinine   Date Value Ref Range Status   2024 0.8 0.5 - 1.4 mg/dL Final     Calcium   Date Value Ref Range Status   2024 9.3 8.7 - 10.5 mg/dL Final     Total Protein   Date Value Ref Range Status   2024 6.9 6.0 - 8.4 g/dL Final     Albumin   Date Value Ref Range Status   2024 3.8 3.5 - 5.2 g/dL Final     Total Bilirubin   Date Value Ref Range Status   2024 0.5 0.1 - 1.0 mg/dL Final     Comment:      For infants and newborns, interpretation of results should be based  on gestational age, weight and in agreement with clinical  observations.    Premature Infant recommended reference ranges:  Up to 24 hours.............<8.0 mg/dL  Up to 48 hours............<12.0 mg/dL  3-5 days..................<15.0 mg/dL  6-29 days.................<15.0 mg/dL       Alkaline Phosphatase   Date Value Ref Range Status   01/26/2024 71 55 - 135 U/L Final     AST   Date Value Ref Range Status   01/26/2024 13 10 - 40 U/L Final     ALT   Date Value Ref Range Status   01/26/2024 14 10 - 44 U/L Final     Anion Gap   Date Value Ref Range Status   01/26/2024 6 (L) 8 - 16 mmol/L Final     eGFR if    Date Value Ref Range Status   10/22/2021 >60.0 >60 mL/min/1.73 m^2 Final     eGFR if non    Date Value Ref Range Status   10/22/2021 >60.0 >60 mL/min/1.73 m^2 Final     Comment:     Calculation used to obtain the estimated glomerular filtration  rate (eGFR) is the CKD-EPI equation.         Lab Results   Component Value Date    WBC 4.55 01/26/2024    HGB 13.5 01/26/2024    HCT 40.3 01/26/2024    MCV 91 01/26/2024     01/26/2024        Lab Results   Component Value Date    CHOL 185 01/26/2024     Lab Results   Component Value Date    HDL 62 01/26/2024     Lab Results   Component Value Date    LDLCALC 106.0 01/26/2024     Lab Results   Component Value Date    TRIG 85 01/26/2024     Lab Results   Component Value Date    CHOLHDL 33.5 01/26/2024     Lab Results   Component Value Date    HGBA1C 5.4 01/26/2024     BP Readings from Last 1 Encounters:   01/26/24 128/82       Food History  Breakfast:  2 cups coffee with dash of half & half; no fat Greek yogurt with fruit and nuts  Mid-morning Snack:  none  Lunch:  leftovers from dinner OR smoked salmon on whole wheat English muffin with avocado or low fat cream cheese  Mid-afternoon Snack:  nuts or a piece of fruit  Dinner: homemade:  soup (carrot, Chadian onion, bean)  or a bean dish or shrimp dish; sometimes with a vegetable  Out to eat 2x per week: Chinese (orange mandarin chicken) or a burger  Snack:  none  *Fluid intake:  water  Alcohol: once in a while socially    Exercise History:  Patient currently attends a TXR class, which consists of 10 minute of vigorous intensity aerobic interval training, full body resistance training, and stretching, 2-3 days a week.     Cultural/Spiritual/Personal Preferences:  None identified    Support System:  spouse, children, parents, and friends    State of Change:  Preparation    Barriers to Change:  none    Diagnosis    No nutrition-related diagnosis at this time.    Intervention    RMR (Method:  InBody):  1347 kcal  Activity Factor:  1.3    TORY:  1751 kcal    Goals:  1.  Use meal prepping and weekly shopping to increase the probability of making healthy meals at home.  2.  Add cardio exercise for 30 minutes at least 2x per week.    Nutrition Education  The following education was provided to the patient:  Complimented patient on dietary compliance/modifications and resulting health improvements.  Complimented patient on physical activity efforts.  Discussed Heart Healthy Eating.  Suggested dietary modifications based on current dietary behaviors and individual food preferences.  Discussed recommended servings of non-starchy vegetables/day and food sources of such.  Discussed goal setting.  Provided ongoing support, encouragement, and guidance toward improved health efforts.    Patient verbalized understanding of nutrition education and recommendations received.    Handouts Provided  Meal Planning Guide  Eat Fit Shopping List  Eat Fit Gayla  Fueling Well On-The-Go    Monitoring/Evaluation    Monitor the following:  Weight  BMI  % Body Fat  Caloric intake  Labs:  lipid panel, HbA1c, vitamin D    Follow Up Plan:  Communication with referring healthcare provider is unnecessary at this time as patient presented as part of annual wellness exam.   However, will follow up with patient in 1-2 years.

## 2024-01-26 NOTE — PROGRESS NOTES
Pt. Has no significant cardiovascular or pulmonary history.  Patient has a history of hypertension and hyperlipidemia for which she takes a Calcium Channel Blocker, ACE Inhibitor, and a Statin.     Physical Limitations:  Patient had her left meniscus repaired about 5-6 years ago and her right repaired in April 2023.  Patient is limited from heavy squats, running, and high impact activity.      Current exercise routine:  Patient currently attends a TXR class, which consists of 10 minute of vigorous intensity aerobic interval training, full body resistance training, and stretching, 2-3 days a week.     Goals:  Patient set a goal weight of 160 lbs.   Notes:  Patient was very friendly and engaged.  She is just getting back into a regular exercise routine after knee surgery and then her son's wedding in November.  Patient asked questions and was receptive to all recommendations made.      The fitness evaluation results are as follows:    D.O.S. 1/26/2024 10/22/2021 10/30/2020 8/14/2019 10/10/2017   Height (in): 63.5 64 63.5 63.5 63.25   Weight (lbs): 170.9 174.8 157.4 162 168   BMI: 29.59058 30.896435 27.32971668 28.90698 29.4651250   Body Fat (%): 41.60 41.40 37.50 33.58 36.34   Waist (cm): 86 84 82 82 78   RBP (mmHg): 120/80 120/82 126/90 128/84 118/80   RHR (bpm): 72 70 64 66 60    Strength Dominant (Lbs): 84 83.711776 85 81.54280 88.8566153    Strength Non Dominant (Lbs): 76 81.370189 80 70 78.5974429   Push-up Assessment: 20 33 40 35 25   Curl-up Assessment: 75 54 75 75 31   Flexibility Testing (cm): 46 45 45 46 48.5   REE (kcals): 1347 1549 1334 1530 1450       Age/gender stratified assessment:     Resting BP: Within Normal Limits   Body Fat %: Poor   Waist Circumfernece: Low Risk    Strength Dominant: 90th    Strength Non Dominant: 90th   Upper Body Endurance: Very Good   Abdominal Endurance: Well Above Average   Lower body Flexibiltiy: Excellent       Recommended fitness guidelines:    -150  minutes of moderate intensity aerobic exercise per week or 75 minutes of vigorous intensity aerobic exercise per week.  Add more days and time of aerobic exercise into your current routine to reach the above guidelines.      -2 to 4 days per week of resistance training for each muscle group.      -Daily stretching with a hold of at least 30 seconds per muscle group.

## 2024-01-26 NOTE — LETTER
January 26, 2024    Timo JOAN Files  [address] [place] [address]             Ken Wong [facility] Bldg  [address]  [facility] [address]  Phone: [phone]  Fax: [phone] Dear  Files:      It was a pleasure seeing you in clinic for your Executive Health exam on 1/26/2024. Enclosed is a copy of the clinic note detailing the history, physical exam findings, laboratory studies, and recommendations at that time. Thank you for allowing me to participate in your medical care.             If you have any questions or concerns, please don't hesitate to call.    Sincerely,        Valarie Wilhelm MD

## 2024-01-26 NOTE — PATIENT INSTRUCTIONS
The shingles and influenza vaccines have been ordered which you will have completed today.     Your results today so far have been normal. If any pending results are abnormal, I will let you know via [facility] or phone call.

## 2024-01-29 LAB
ANA PATTERN 1: NORMAL
ANA PATTERN 2: NORMAL
ANA SER QL IF: POSITIVE
ANA TITR SER IF: NORMAL {TITER}

## 2024-02-02 LAB
ANTI SM ANTIBODY: 0.1 RATIO (ref 0–0.99)
ANTI SM/RNP ANTIBODY: 0.15 RATIO (ref 0–0.99)
ANTI-SM INTERPRETATION: NEGATIVE
ANTI-SM/RNP INTERPRETATION: NEGATIVE
ANTI-SSA ANTIBODY: 0.07 RATIO (ref 0–0.99)
ANTI-SSA INTERPRETATION: NEGATIVE
ANTI-SSB ANTIBODY: 0.13 RATIO (ref 0–0.99)
ANTI-SSB INTERPRETATION: NEGATIVE
DSDNA AB SER-ACNC: NORMAL [IU]/ML

## 2024-02-16 PROBLEM — R76.8 POSITIVE ANA (ANTINUCLEAR ANTIBODY): Status: ACTIVE | Noted: 2024-02-16

## 2024-04-29 PROBLEM — N39.0 RECURRENT UTI: Chronic | Status: RESOLVED | Noted: 2024-01-26 | Resolved: 2024-04-29

## 2024-11-01 DIAGNOSIS — Z00.00 ROUTINE GENERAL MEDICAL EXAMINATION AT A HEALTH CARE FACILITY: Primary | ICD-10-CM

## 2025-01-14 NOTE — PROGRESS NOTES
Nutrition Assessment  Session Time:  60 minutes      Client name:  Timo Camargo  :  1965  Age:  59 y.o.  Gender:  female    Client states:  Very pleasant patient here today for annual [facility]  physical and nutrition assessment. She is  with 2 children. She is employed by  [employer]  and works as a  pediatric physical therapist. She has been participating in nutrition consultation since starting in . She reports an increase in her %body mass from past measurements. She endorses decrease in activity. She is mindful when eating during the day and packs a lunch but at night will eat out for a quick meal. She endorses the need to improve activity and decrease %body fat. Labs reviewed and discussed. 24 hr recall obtained and discussed in detail.     Nutrition goals: Weight Loss and increase activity    Anthropometrics  Height:  64  inches     Weight:  179.3 lbs  BMI:   31.3   % Body Fat:  44.1    Clinical Signs/Symptoms  N/V/D:  None  Appetite:  good       Past Medical History:   Diagnosis Date    Agatston CAC score, <100 2024    Essential (primary) hypertension 2019    Granulomatous disease 2022    History of tobacco use disorder 2024    Hormone replacement therapy, postmenopausal 2024    Hypercholesterolemia 2019    Reactive depression 2019    Recurrent HSV (herpes simplex virus) 2024    Recurrent UTI 2024       Past Surgical History:   Procedure Laterality Date     SECTION N/A      SECTION N/A     COLONOSCOPY N/A 2019    Procedure: COLONOSCOPY;  Surgeon: Nahun Burciaga MD;  Location: [facility];  Service: Endoscopy;  Laterality: N/A;    OVARIAN CYST REMOVAL      REPAIR OF MENISCUS OF KNEE Right 2023    REPAIR OF MENISCUS OF KNEE Left        Medications has a current medication list which includes the following prescription(s): amlodipine, aspirin, bupropion, estradiol  0.05 mg/24 hr td ptsw, folic acid, multivit-iron-min-folic acid, nitrofurantoin, progesterone, progesterone, micronized, ramipril, simvastatin, valacyclovir, and vitamin d.    Vitamins, Minerals, and/or Supplements:  None    Food/Medication Interactions:  Reviewed     Food Allergies or Intolerances:  NKFA    Social History    Marital status:    Employment:  Physical Therapist for [employer]    Social History     Tobacco Use    Smoking status: Former     Current packs/day: 0.00     Average packs/day: 1 pack/day for 15.0 years (15.0 ttl pk-yrs)     Types: Cigarettes     Start date: 1/3/1990     Quit date: 1/3/2005     Years since quittin.0    Smokeless tobacco: Never   Substance Use Topics    Alcohol use: Yes     Comment: social drinker        Lab Reports   Sodium   Date Value Ref Range Status   2025 140 136 - 145 mmol/L Final     Potassium   Date Value Ref Range Status   2025 4.4 3.5 - 5.1 mmol/L Final     Chloride   Date Value Ref Range Status   2025 107 95 - 110 mmol/L Final     CO2   Date Value Ref Range Status   2025 25 23 - 29 mmol/L Final     Glucose   Date Value Ref Range Status   2025 91 70 - 110 mg/dL Final     BUN   Date Value Ref Range Status   2025 16 6 - 20 mg/dL Final     Creatinine   Date Value Ref Range Status   2025 0.7 0.5 - 1.4 mg/dL Final     Calcium   Date Value Ref Range Status   2025 9.3 8.7 - 10.5 mg/dL Final     Total Protein   Date Value Ref Range Status   2025 7.2 6.0 - 8.4 g/dL Final     Albumin   Date Value Ref Range Status   2025 3.9 3.5 - 5.2 g/dL Final     Total Bilirubin   Date Value Ref Range Status   2025 0.4 0.1 - 1.0 mg/dL Final     Comment:     For infants and newborns, interpretation of results should be based  on gestational age, weight and in agreement with clinical  observations.    Premature Infant recommended reference ranges:  Up to 24 hours.............<8.0 mg/dL  Up  to 48 hours............<12.0 mg/dL  3-5 days..................<15.0 mg/dL  6-29 days.................<15.0 mg/dL       Alkaline Phosphatase   Date Value Ref Range Status   01/17/2025 72 40 - 150 U/L Final     AST   Date Value Ref Range Status   01/17/2025 14 10 - 40 U/L Final     ALT   Date Value Ref Range Status   01/17/2025 16 10 - 44 U/L Final     Anion Gap   Date Value Ref Range Status   01/17/2025 8 8 - 16 mmol/L Final     eGFR if    Date Value Ref Range Status   10/22/2021 >60.0 >60 mL/min/1.73 m^2 Final     eGFR if non    Date Value Ref Range Status   10/22/2021 >60.0 >60 mL/min/1.73 m^2 Final     Comment:     Calculation used to obtain the estimated glomerular filtration  rate (eGFR) is the CKD-EPI equation.         Lab Results   Component Value Date    WBC 5.23 01/17/2025    HGB 13.9 01/17/2025    HCT 43.2 01/17/2025    MCV 91 01/17/2025     01/17/2025        Lab Results   Component Value Date    CHOL 173 01/17/2025     Lab Results   Component Value Date    HDL 60 01/17/2025     Lab Results   Component Value Date    LDLCALC 99.0 01/17/2025     Lab Results   Component Value Date    TRIG 70 01/17/2025     Lab Results   Component Value Date    CHOLHDL 34.7 01/17/2025     Lab Results   Component Value Date    HGBA1C 5.4 01/17/2025     BP Readings from Last 1 Encounters:   01/17/25 132/81       Food History  Meal Pattern: 3 meals daily and 1-2 snacks daily  Diet Modifications: Mind diet; Mediterranean, or DASH  Avoids: processed foods  Breakfast:  yogurt, berries nuts, cinnamon or avocado toast w/ egg  Mid-morning Snack: none  Lunch:  protein with salad. Adds veggies, olives, tomato, etc  Mid-afternoon Snack:  nuts  Dinner:  usually protein source with a veggie  Snack:  varies  *Fluid intake: Drinks - caffeine - 2 cups Coffee , water - 48 to 64 ounces per day    Exercise History:  No exercise routine     Cultural/Spiritual/Personal Preferences:  None identified    Support  System:  spouse    State of Change:  Preparation      Barriers to Change:  Readiness  and Time Constraints     Diagnosis    Overweight related to Energy intake of greater than established reference standards or recommendations based on physiological needs as evidenced by Body mass index is 31.41 kg/m². A BMI of 30.0-34.9 kg/m2 reflects Obesity Class I .    Intervention    RMR (Method:  InBody):  1352 kcal  Activity Factor:  1.3    TORY:  1757 - 250 = 1507 kcal    Protein: 85 to 100  gm (1.0 - 1.2 gm/kg)  Fluid: 96 ounces per day                              Goals:  1.  Continue current healthy eating.   2.  Aim to drink about 96 oz of water every day  3.  Add 250 mg of Magnesium at pm  4.  Try use of portion control cups for meal planning      Nutrition Education  The following education was provided to the patient:  Complimented patient on proactive role in health maintenance.  Complimented patient on dietary compliance/modifications and resulting health improvements.  Discussed meal planning/[employer]'s My Plate design.  Suggested dietary modifications based on current dietary behaviors and individual food preferences.  Discussed physical activity guidelines and its associated benefits.  Discussed nutrition-related lab values and dietary and/or lifestyle factors affecting them.  Discussed recommended servings of fruit/day and food sources of such.  Discussed recommended servings of non-starchy vegetables/day and food sources of such.  Discussed recommended fiber intake and food sources of such.  Discussed benefits of adequate hydration and recommended fluid intake.  Discussed vitamin/mineral recommendations (may include but not limited to MVI, Vitamin D, Calcium, and/or Iron) and associated food sources.  Discussed caffeine intake (recommended intake, potential health consequences of excessive caffeine, sources of caffeine, etc.).  Provided ongoing support, encouragement, and guidance toward improved health  efforts.    Patient verbalized understanding of nutrition education and recommendations received.    Handouts Provided   None    NCM Additional Handouts provided:  No additional information provided.      Monitoring/Evaluation    Monitor the following:  Weight  BMI  % Body Fat  Caloric intake  Labs:  Annual - CBC, CMP, A1C, LIPID PROFILE - HDL, LDL, TRIG, CHOL    Follow Up Plan:  Communication with referring healthcare provider is unnecessary at this time as patient presented as part of annual wellness exam.  However, will follow up with patient in 1-2 years.

## 2025-01-17 ENCOUNTER — CLINICAL SUPPORT (OUTPATIENT)
Dept: INTERNAL MEDICINE | Facility: CLINIC | Age: 60
End: 2025-01-17
Payer: COMMERCIAL

## 2025-01-17 ENCOUNTER — HOSPITAL ENCOUNTER (OUTPATIENT)
Dept: CARDIOLOGY | Facility: HOSPITAL | Age: 60
Discharge: HOME | End: 2025-01-17
Attending: EMERGENCY MEDICINE
Payer: COMMERCIAL

## 2025-01-17 ENCOUNTER — OFFICE VISIT (OUTPATIENT)
Dept: INTERNAL MEDICINE | Facility: CLINIC | Age: 60
End: 2025-01-17
Payer: COMMERCIAL

## 2025-01-17 VITALS
WEIGHT: 183 LBS | BODY MASS INDEX: 31.24 KG/M2 | SYSTOLIC BLOOD PRESSURE: 132 MMHG | HEART RATE: 84 BPM | DIASTOLIC BLOOD PRESSURE: 81 MMHG | HEIGHT: 64 IN

## 2025-01-17 VITALS — WEIGHT: 183 LBS | HEIGHT: 64 IN | BODY MASS INDEX: 31.24 KG/M2

## 2025-01-17 DIAGNOSIS — Z00.00 ROUTINE GENERAL MEDICAL EXAMINATION AT A HEALTH CARE FACILITY: ICD-10-CM

## 2025-01-17 DIAGNOSIS — Z00.00 ENCOUNTER FOR SCREENING AND PREVENTATIVE CARE: Primary | ICD-10-CM

## 2025-01-17 DIAGNOSIS — Z00.00 ROUTINE GENERAL MEDICAL EXAMINATION AT A HEALTH CARE FACILITY: Primary | ICD-10-CM

## 2025-01-17 LAB
25(OH)D3+25(OH)D2 SERPL-MCNC: 50 NG/ML (ref 30–96)
ALBUMIN SERPL BCP-MCNC: 3.9 G/DL (ref 3.5–5.2)
ALP SERPL-CCNC: 72 U/L (ref 40–150)
ALT SERPL W/O P-5'-P-CCNC: 16 U/L (ref 10–44)
ANION GAP SERPL CALC-SCNC: 8 MMOL/L (ref 8–16)
AST SERPL-CCNC: 14 U/L (ref 10–40)
BILIRUB SERPL-MCNC: 0.4 MG/DL (ref 0.1–1)
BUN SERPL-MCNC: 16 MG/DL (ref 6–20)
CALCIUM SERPL-MCNC: 9.3 MG/DL (ref 8.7–10.5)
CHLORIDE SERPL-SCNC: 107 MMOL/L (ref 95–110)
CHOLEST SERPL-MCNC: 173 MG/DL (ref 120–199)
CHOLEST/HDLC SERPL: 2.9 {RATIO} (ref 2–5)
CO2 SERPL-SCNC: 25 MMOL/L (ref 23–29)
CREAT SERPL-MCNC: 0.7 MG/DL (ref 0.5–1.4)
CV STRESS BASE HR: 84 BPM
DIASTOLIC BLOOD PRESSURE: 81 MMHG
ERYTHROCYTE [DISTWIDTH] IN BLOOD BY AUTOMATED COUNT: 11.9 % (ref 11.5–14.5)
EST. GFR  (NO RACE VARIABLE): >60 ML/MIN/1.73 M^2
ESTIMATED AVG GLUCOSE: 108 MG/DL (ref 68–131)
GLUCOSE SERPL-MCNC: 91 MG/DL (ref 70–110)
HBA1C MFR BLD: 5.4 % (ref 4–5.6)
HCT VFR BLD AUTO: 43.2 % (ref 37–48.5)
HDLC SERPL-MCNC: 60 MG/DL (ref 40–75)
HDLC SERPL: 34.7 % (ref 20–50)
HGB BLD-MCNC: 13.9 G/DL (ref 12–16)
LDLC SERPL CALC-MCNC: 99 MG/DL (ref 63–159)
MCH RBC QN AUTO: 29.3 PG (ref 27–31)
MCHC RBC AUTO-ENTMCNC: 32.2 G/DL (ref 32–36)
MCV RBC AUTO: 91 FL (ref 82–98)
NONHDLC SERPL-MCNC: 113 MG/DL
OHS CV CPX 1 MINUTE RECOVERY HEART RATE: 129 BPM
OHS CV CPX 85 PERCENT MAX PREDICTED HEART RATE MALE: 139
OHS CV CPX ESTIMATED METS: 12
OHS CV CPX MAX PREDICTED HEART RATE: 163
OHS CV CPX PATIENT IS FEMALE: 1
OHS CV CPX PATIENT IS MALE: 0
OHS CV CPX PEAK DIASTOLIC BLOOD PRESSURE: 82 MMHG
OHS CV CPX PEAK HEAR RATE: 142 BPM
OHS CV CPX PEAK RATE PRESSURE PRODUCT: NORMAL
OHS CV CPX PEAK SYSTOLIC BLOOD PRESSURE: 166 MMHG
OHS CV CPX PERCENT MAX PREDICTED HEART RATE ACHIEVED: 91
OHS CV CPX RATE PRESSURE PRODUCT PRESENTING: NORMAL
PLATELET # BLD AUTO: 294 K/UL (ref 150–450)
PMV BLD AUTO: 10.2 FL (ref 9.2–12.9)
POTASSIUM SERPL-SCNC: 4.4 MMOL/L (ref 3.5–5.1)
PROT SERPL-MCNC: 7.2 G/DL (ref 6–8.4)
RBC # BLD AUTO: 4.74 M/UL (ref 4–5.4)
SODIUM SERPL-SCNC: 140 MMOL/L (ref 136–145)
STRESS ECHO POST EXERCISE DUR MIN: 7 MINUTES
STRESS ECHO POST EXERCISE DUR SEC: 19 SECONDS
SYSTOLIC BLOOD PRESSURE: 132 MMHG
TRIGL SERPL-MCNC: 70 MG/DL (ref 30–150)
TSH SERPL DL<=0.005 MIU/L-ACNC: 2.31 UIU/ML (ref 0.4–4)
WBC # BLD AUTO: 5.23 K/UL (ref 3.9–12.7)

## 2025-01-17 PROCEDURE — 99999 PR PBB SHADOW E&M-EST. PATIENT-LVL I: CPT | Mod: PBBFAC,,,

## 2025-01-17 PROCEDURE — 99396 PREV VISIT EST AGE 40-64: CPT | Mod: S$GLB,,, | Performed by: EMERGENCY MEDICINE

## 2025-01-17 PROCEDURE — 99999 PR PBB SHADOW E&M-EST. PATIENT-LVL II: CPT | Mod: PBBFAC,,,

## 2025-01-17 PROCEDURE — 85027 COMPLETE CBC AUTOMATED: CPT | Performed by: EMERGENCY MEDICINE

## 2025-01-17 PROCEDURE — 97802 MEDICAL NUTRITION INDIV IN: CPT | Mod: S$GLB,,, | Performed by: DIETITIAN, REGISTERED

## 2025-01-17 PROCEDURE — 97750 PHYSICAL PERFORMANCE TEST: CPT | Mod: S$GLB,,, | Performed by: INTERNAL MEDICINE

## 2025-01-17 PROCEDURE — 93018 CV STRESS TEST I&R ONLY: CPT | Mod: ,,, | Performed by: INTERNAL MEDICINE

## 2025-01-17 PROCEDURE — 83036 HEMOGLOBIN GLYCOSYLATED A1C: CPT | Performed by: EMERGENCY MEDICINE

## 2025-01-17 PROCEDURE — 80061 LIPID PANEL: CPT | Performed by: EMERGENCY MEDICINE

## 2025-01-17 PROCEDURE — 99999 PR PBB SHADOW E&M-EST. PATIENT-LVL I: CPT | Mod: PBBFAC,,, | Performed by: EMERGENCY MEDICINE

## 2025-01-17 PROCEDURE — 80053 COMPREHEN METABOLIC PANEL: CPT | Performed by: EMERGENCY MEDICINE

## 2025-01-17 PROCEDURE — 84443 ASSAY THYROID STIM HORMONE: CPT | Performed by: EMERGENCY MEDICINE

## 2025-01-17 PROCEDURE — 82306 VITAMIN D 25 HYDROXY: CPT | Performed by: EMERGENCY MEDICINE

## 2025-01-17 PROCEDURE — 93016 CV STRESS TEST SUPVJ ONLY: CPT | Mod: ,,, | Performed by: INTERNAL MEDICINE

## 2025-01-17 PROCEDURE — 93017 CV STRESS TEST TRACING ONLY: CPT

## 2025-01-17 PROCEDURE — 3044F HG A1C LEVEL LT 7.0%: CPT | Mod: CPTII,S$GLB,, | Performed by: EMERGENCY MEDICINE

## 2025-01-17 NOTE — PROGRESS NOTES
[facility]      TODAY'S Date 1/17/2025  Patient ID: Timo Camargo is a 59 y.o. female   MRN: [id number]  Primary Care Physician (PCP):  Kostas Bhatia MD    SUBJECTIVE         Chief Complaint:   Chief Complaint   Patient presents with    [facility]     Annual, has been well. Goes to GP q 6 mos.      HISTORY OF PRESENT ILLNESS (HPI):   Reviewed medical, surgical, social and family history, medications, appropriate preventive health screenings, as well as vaccination history. Updates as noted below or in assessment and plan.    This is a very pleasant 59 y.o.  female with PMHx hypertension, reactive depression, recurrent UTI.   Although presenting for her usual annual exam in [facility], she is a new patient to me.  The patient is currently in good health, with the exception of knee pain.    MUSCULOSKELETAL:  She reports right knee pain with history of meniscus debridement. She believes she may have arthritis in the right knee. The pain limits her ability to perform high-impact exercises, though she can walk with discomfort. She denies ability to jog or engage in activities involving bouncing. She previously engaged in TRX exercises but is currently off her fitness routine.    MEDICAL HISTORY:  She has a history of hypertension since her 30s managed with Ramipril. She has a positive EVA with normal lupus screen results. She has medi lupus affecting her chest area. She has a significant history of sun exposure. She has recurrent urinary tract infections managed with prophylactic antibiotics. A recent thyroid ultrasound was performed due to a cyst.    CURRENT MEDICATIONS:  She takes Amlodipine 2.5mg, Ramipril, nightly prophylactic antibiotic for UTI suppression, hormone therapy managed by gynecologist, long-term Wellbutrin, and Vitamin D supplementation every other day due to previously elevated levels.    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    SLEEP:  She wakes up at 4:30 AM. Her Fitbit tracks sleep quality as typically "fair" with recent improvement to "good" ratings. She is actively working on improving sleep hygiene.    DIET:  She drinks water and limits coffee to two cups in the morning. She eats out twice weekly.    VISION:  She wears contacts and glasses with last eye exam approximately 1 year ago.    IMMUNIZATIONS:  She receives annual flu vaccines. She has received first dose of shingles vaccine series and is up to date on TDAP.      A review of medical records indicates patient has seen Obstetrics and Gynecology - Татьяна Selby    ROS:  General: -fever, -chills, -fatigue, -weight gain, -weight loss  Eyes: -vision changes, -redness, -discharge  ENT: -ear pain, -nasal congestion, -sore throat  Cardiovascular: -chest pain, -palpitations, -lower extremity edema  Respiratory: -cough, -shortness of breath  Gastrointestinal: -abdominal pain, -nausea, -vomiting, -diarrhea, -constipation, -blood in stool  Genitourinary: -dysuria, -hematuria, -frequency  Musculoskeletal: +joint pain, -muscle pain  Skin: +rash, -lesion  Neurological: -headache, -dizziness, -numbness, -tingling  Psychiatric: -anxiety, -depression, -sleep difficulty             Patient Active Problem List   Diagnosis    Essential (primary) hypertension    Hypercholesterolemia    Reactive depression    Granulomatous disease    Mild CAD    Agatston CAC score, <100    Recurrent HSV (herpes simplex virus)    Hormone replacement therapy, postmenopausal    BMI 29.0-29.9,adult    Positive EVA (antinuclear antibody)         Immunization History   Administered Date(s) Administered    COVID-19 Vaccine 02/10/2021, 03/10/2021    COVID-19, MRNA, LN-S, PF (MODERNA FULL 0.5 ML DOSE) 02/10/2021, 03/10/2021    Influenza 11/04/2019    Influenza - Quadrivalent - MDCK - PF 12/06/2022    Influenza - Quadrivalent - PF *Preferred* (6 months and older) 11/04/2019, 10/30/2020, " 10/22/2021, 2024    Influenza A (H1N1) 2009 Monovalent - IM 03/10/2010    Tdap 2019    Zoster Recombinant 2024     Past Medical History:   Diagnosis Date    Agatston CAC score, <100 2024    Essential (primary) hypertension 2019    Granulomatous disease 2022    History of tobacco use disorder 2024    Hormone replacement therapy, postmenopausal 2024    Hypercholesterolemia 2019    Reactive depression 2019    Recurrent HSV (herpes simplex virus) 2024    Recurrent UTI 2024     Past Surgical History:   Procedure Laterality Date     SECTION N/A      SECTION N/A     COLONOSCOPY N/A 2019    Procedure: COLONOSCOPY;  Surgeon: Nahun Burciaga MD;  Location: [facility];  Service: Endoscopy;  Laterality: N/A;    OVARIAN CYST REMOVAL      REPAIR OF MENISCUS OF KNEE Right 2023    REPAIR OF MENISCUS OF KNEE Left      Family History   Problem Relation Name Age of Onset    Hypertension Mother      Dementia Mother  70        senile dementia    Coronary artery disease Father  59        cabg    Hypertension Father      No Known Problems Sister      No Known Problems Sister      No Known Problems Sister      Breast cancer Maternal Aunt      Breast cancer Paternal Aunt      Pancreatic cancer Maternal Grandmother      No Known Problems Maternal Grandfather      No Known Problems Paternal Grandmother      Cancer Paternal Grandfather      No Known Problems Brother      No Known Problems Brother 1/2 dad     No Known Problems Son      No Known Problems Son       Social History     Tobacco Use    Smoking status: Former     Current packs/day: 0.00     Average packs/day: 1 pack/day for 15.0 years (15.0 ttl pk-yrs)     Types: Cigarettes     Start date: 1/3/1990     Quit date: 1/3/2005     Years since quittin.0    Smokeless tobacco: Never   Substance Use Topics    Alcohol use: Yes     Comment: social drinker    Drug use: No      Past Medical, Surgical and Social history reviewed and verified by me.     Review of patient's allergies indicates:   Allergen Reactions    Penicillins Rash    Sulfa (sulfonamide antibiotics) Rash    Vancomycin analogues      Current Outpatient Medications on File Prior to Visit   Medication Sig Dispense Refill    amLODIPine (NORVASC) 2.5 MG tablet Take 1 tablet (2.5 mg total) by mouth once daily. 90 tablet 3    aspirin (ECOTRIN) 81 MG EC tablet Take 81 mg by mouth once daily.      buPROPion (WELLBUTRIN XL) 300 MG 24 hr tablet Take 1 tablet (300 mg total) by mouth once daily. 90 tablet 3    estradiol 0.05 mg/24 hr td ptsw (VIVELLE-DOT) 0.05 mg/24 hr Place 1 patch onto the skin twice a week. 24 patch 3    folic acid (FOLVITE) 1 MG tablet Take 1 mg by mouth once daily.      MULTIVIT-IRON-MIN-FOLIC ACID 3,500-18-0.4 UNIT-MG-MG ORAL CHEW Take by mouth once daily.      nitrofurantoin (MACRODANTIN) 100 MG capsule Take 1 capsule (100 mg total) by mouth once daily. 90 capsule 1    progesterone (PROMETRIUM) 100 MG capsule Take 100 mg by mouth once daily. (Patient not taking: Reported on 9/24/2024)      PROGESTERONE MICRONIZED ORAL Take 300 mg by mouth once daily.      ramipriL (ALTACE) 5 MG capsule Take 1 capsule (5 mg total) by mouth once daily. 90 capsule 3    simvastatin (ZOCOR) 10 MG tablet Take 1 tablet (10 mg total) by mouth every evening. for 180 doses 90 tablet 1    valACYclovir (VALTREX) 1000 MG tablet Take 1 tablet (1,000 mg total) by mouth every 12 (twelve) hours. 14 tablet 3    vitamin D (VITAMIN D3) 1000 units Tab Take 1,000 Units by mouth once daily.       No current facility-administered medications on file prior to visit.       OBJECTIVE     PHYSICAL EXAM    Vital Signs (Most Recent):   Blood pressure 132/81, heart rate 84   Weight:   Wt Readings from Last 1 Encounters:   01/17/25 83 kg (182 lb 15.7 oz)   BMI 31.41      Vital signs and nursing assessment noted:  Relatively normal vitals    GEN:   NAD, A &  Ox3, atraumatic, well appearing, nontoxic appearing  HEENT:   PERRLA, EOMI, moist membranes, nl conjunctiva, no scleral icterus, no nystagmus, no nodes/nodules, soft, supple, FROM, no trachial deviation, nexus negative, normal TMs bilaterally, normal pharynx  CV:   RRR no m/r/g, 2+ radial pulses, <2sec cap refill, no obvious JVD  RESP:   CTA B, no w/r/r, equal and bilateral chest rise, no respiratory distress  ABD:   soft, Nontender, Nondistended, +BS, no guarding/rebound  :   Deferred  BACK:   FROM, no midline tenderness, no paraspinal tenderness  EXT:   FROM, JEAN-BAPTISTE x 4, no swelling, no edema, no calf tenderness, no bony tenderness, no warmth or redness, no crepitus, no obvious deformity  LYMPH:   no gross adenopathy  NEURO:   GCS 15, CN II-XII grossly intact, no obvious motor/sensory deficit, no tremor, negative Romberg,  nl gait/coordination  PSYCH:    Cooperative, no SI/HI, no anxiety, nl mood/affect, nl judgement/thought process  SKIN:  no rashes/lesions or masses, nl color, no pallor, warm, dry, intact            Tests      Results for orders placed or performed during the hospital encounter of 01/26/24   EKG 12-lead    Collection Time: 01/26/24  8:52 AM    Narrative    Test Reason : Z00.00,    Vent. Rate : 080 BPM     Atrial Rate : 080 BPM     P-R Int : 146 ms          QRS Dur : 066 ms      QT Int : 352 ms       P-R-T Axes : 073 054 065 degrees     QTc Int : 405 ms    Baseline wander  Normal sinus rhythm  Low voltage, precordial leads  Septal infarct ,age undetermined  Abnormal ECG  When compared with ECG of 20-JAN-2023 08:27,  Criteria for Septal infarct Now present May be due to lead misplacement  Clinical Correlation Required  Confirmed by Catrachito Davenport MD ([id number]) on 1/26/2024 12:45:43 PM    Referred By: JEFFREY ROBLERO           Confirmed By:Catrachito Davenport MD      Labs reviewed and independently interpreted. Imaging studies reviewed.   Recent Results (from the past 12 weeks)   Comprehensive metabolic panel     Collection Time: 01/17/25  7:48 AM   Result Value Ref Range    Sodium 140 136 - 145 mmol/L    Potassium 4.4 3.5 - 5.1 mmol/L    Chloride 107 95 - 110 mmol/L    CO2 25 23 - 29 mmol/L    Glucose 91 70 - 110 mg/dL    BUN 16 6 - 20 mg/dL    Creatinine 0.7 0.5 - 1.4 mg/dL    Calcium 9.3 8.7 - 10.5 mg/dL    Total Protein 7.2 6.0 - 8.4 g/dL    Albumin 3.9 3.5 - 5.2 g/dL    Total Bilirubin 0.4 0.1 - 1.0 mg/dL    Alkaline Phosphatase 72 40 - 150 U/L    AST 14 10 - 40 U/L    ALT 16 10 - 44 U/L    eGFR >60.0 >60 mL/min/1.73 m^2    Anion Gap 8 8 - 16 mmol/L   CBC Without Differential    Collection Time: 01/17/25  7:48 AM   Result Value Ref Range    WBC 5.23 3.90 - 12.70 K/uL    RBC 4.74 4.00 - 5.40 M/uL    Hemoglobin 13.9 12.0 - 16.0 g/dL    Hematocrit 43.2 37.0 - 48.5 %    MCV 91 82 - 98 fL    MCH 29.3 27.0 - 31.0 pg    MCHC 32.2 32.0 - 36.0 g/dL    RDW 11.9 11.5 - 14.5 %    Platelets 294 150 - 450 K/uL    MPV 10.2 9.2 - 12.9 fL   Lipid panel    Collection Time: 01/17/25  7:48 AM   Result Value Ref Range    Cholesterol 173 120 - 199 mg/dL    Triglycerides 70 30 - 150 mg/dL    HDL 60 40 - 75 mg/dL    LDL Cholesterol 99.0 63.0 - 159.0 mg/dL    HDL/Cholesterol Ratio 34.7 20.0 - 50.0 %    Total Cholesterol/HDL Ratio 2.9 2.0 - 5.0    Non-HDL Cholesterol 113 mg/dL   TSH    Collection Time: 01/17/25  7:48 AM   Result Value Ref Range    TSH 2.309 0.400 - 4.000 uIU/mL   Hemoglobin A1c    Collection Time: 01/17/25  7:48 AM   Result Value Ref Range    Hemoglobin A1C 5.4 4.0 - 5.6 %    Estimated Avg Glucose 108 68 - 131 mg/dL   Vitamin D    Collection Time: 01/17/25  7:48 AM   Result Value Ref Range    Vit D, 25-Hydroxy 50 30 - 96 ng/mL   Exercise Stress - EKG    Collection Time: 01/17/25  9:43 AM   Result Value Ref Range    85% Max Predicted      Max Predicted      OHS CV CPX PATIENT IS MALE 0.0     [facility] CV CPX PATIENT IS FEMALE 1.0     HR at rest 84 bpm    Systolic blood pressure 132 mmHg    Diastolic blood pressure 81 mmHg     RPP 11,088     Exercise duration (min) 7 minutes    Exercise duration (sec) 19 seconds    Peak  bpm    Peak Systolic  mmHg    Peak Diatolic BP 82 mmHg    Peak RPP 23,572     Estimated METs 12     % Max HR Achieved 91     1 Minute Recovery  bpm      Mammo Digital Screening Bilat w/ Jacob December 2024 Impression: No mammographic evidence of malignancy. BI-RADS Category 1: Negative    US THYROID October 2024 Impression: Scattered subcentimeter thyroid nodules, as above    DXA BONE DENSITY AXIAL SKELETON 1 OR MORE SITES January 2024 Impression: *Normal bone mineral density    CT FOOT WITHOUT CONTRAST RIGHT July 2020 Impression: 3 mm osseous excrescence, great toe distal phalanx, likely a small subungual exostosis Otherwise, unremarkable exam    ASSESSMENT/PLAN:     MDM  Reviewed: previous chart, nursing note and vitals  Reviewed previous: labs, ECG, ultrasound and CT scan  Interpretation: labs (relatively unremarkable Lipid Panel, CMP, CBC, TSH, HgA1C, vitamin D) Exercise Stress - EKG Jan 2025 shows above average exercise capacity with out evidence of heart attack.    Health Maintenance   Topic Date Due    Pneumococcal Vaccines (Age 50+) (1 of 2 - PCV) Never done    High Dose Statin  Never done    Shingles Vaccine (2 of 2) 03/22/2024    Influenza Vaccine (1) 09/01/2024    COVID-19 Vaccine (5 - 2024-25 season) 09/01/2024    Cervical Cancer Screening  06/13/2025    Aspirin/Antiplatelet Therapy  09/24/2025    Mammogram  12/02/2025    DEXA Scan  01/26/2026    Hemoglobin A1c (Diabetic Prevention Screening)  01/17/2028    TETANUS VACCINE  04/25/2029    Colorectal Cancer Screening  08/26/2029    RSV Vaccine (Age 60+ and Pregnant patients) (1 - 1-dose 75+ series) 07/16/2042    Hepatitis C Screening  Completed    HIV Screening  Completed       Timo was seen today for [employer] health.    Diagnoses and all orders for this visit:    Encounter for screening and preventative care     Assessment & Plan     IMPRESSION:  - Reviewed recent labs: normal blood count, electrolytes, liver function, cholesterol, vitamin D, hemoglobin A1c, and thyroid function  - Noted history of positive EVA and previous normal lupus screen  - Assessed recent thyroid ultrasound showing very small, scattered findings with normal thyroid function  - Evaluated recent normal mammogram results  - Considered long-term use of Wellbutrin for mental health management    - Educated on the importance of completing the shingles vaccine series  - Discussed potential need for additional vaccines (RSV, Pneumovax) when patient has grandchildren in the future    - Ms. Files to eat out only once a week  - Ms. Files to walk consistently in the morning  - Recommend losing approximately 15 lbs, aiming for a target weight of 162 lbs  - Ms. Files to eat healthier by consuming more whole foods, fruits, and vegetables  - Ms. Files to schedule a dermatology appointment for overall skin check due to history of sun exposure    - Continued amlodipine 2.5 mg  - Continued Ramipril  - Continued antibiotic for UTI prophylaxis, taken nightly  - Continued hormone therapy (prescribed by gynecologist)  - Continued Wellbutrin  - Continued vitamin D every other day         The results of physical exam findings, labs, and imaging were reviewed with the patient. Management of above assessments/visit diagnoses was discussed with patient. Precautions for return discussed at length. Patient was given ample time for questions. All questions asked and answered to the satisfaction of the patient. Patient is in agreement with the above and verbalized understanding. Total time spent caring for the patient today was 30 minutes. This includes time spent before the visit reviewing the chart, time spent during the visit, and time spent after the visit on documentation.    Georgette Teixeira MD  [facility]    Disclaimer: This document was created using  voice recognition software (M*Modal Fluency Direct). Although it may be edited, this document may contain errors related to incorrect recognition of the spoken word. Please contact the physician if clarification is needed.

## 2025-01-17 NOTE — PROGRESS NOTES
Pt. Has no significant cardiovascular or pulmonary history.  Patient has a history of hypertension and hyperlipidemia for which she takes a Calcium Channel Blocker, ACE Inhibitor, and a Statin.      Physical Limitations:  Patient had her left meniscus repaired about 7 years ago and her right repaired in April 2023.  She believes she has right knee arthritis and is limited from heavy squats, running, and high impact activity.    Current exercise routine:  Patient does not follow any formal exercise or flexibility routine at the current time.    Goals:  Patient set a goal weight of 162 lbs  Notes:  Patient was friendly and engaged.  She used to regularly attend a TRX class which consisted of high intensity aerobic interval training and full-body resistance training exercises.  She has been out of a regular exercise routine but would like to get back to the class and start a walking routine.  Patient asked questions and was receptive to all recommendations.      The fitness evaluation results are as follows:    D.O.S. 1/17/2025 1/26/2024 10/22/2021 10/30/2020 8/14/2019 10/10/2017   Height (in): 63.5 63.5 64 63.5 63.5 63.25   Weight (lbs): 179.3 170.9 174.8 157.4 162 168   BMI: 31.850186 29.05629 30.3318866 27.842845 28.09372 29.511863   Body Fat (%): 44.10 41.60 41.40 37.50 33.58 36.34   Waist (cm): 90 86 84 82 82 78   RBP (mmHg): 102/66 120/80 120/82 126/90 128/84 118/80   RHR (bpm): 72 72 70 64 66 60    Strength Dominant (Lbs): 80 84 83.7445265 85 81.04220 88.252440    Strength Non Dominant (Lbs): 77 76 81.7336163 80 70 78.926502   Push-up Assessment: 15 20 33 40 35 25   Curl-up Assessment: 51 75 54 75 75 31   Flexibility Testing (cm): 41 46 45 45 46 48.5   REE (kcals): 1352 1347 1549 1334 1530 1450       Age/gender stratified assessment:    Resting BP: Within Normal Limits   Body Fat %: Poor   Waist Circumfernece: High Risk    Strength Dominant: 90th    Strength Non Dominant: 90th   Upper Body  Endurance: Very Good   Abdominal Endurance: Well Above Average   Lower body Flexibiltiy: Excellent       Recommended fitness guidelines:    -150 minutes of moderate intensity aerobic exercise per week or 75 minutes of vigorous intensity aerobic exercise per week.  Try to walk for 30 minutes, 5 days a week in minimum bouts of 10 minutes at a time.  Incorporate some interval training to increase your heart rate for short periods of time.    -2 to 4 days per week of resistance training for each muscle group.      -Daily stretching with a hold of at least 30 seconds per muscle group.